# Patient Record
Sex: FEMALE | Race: WHITE | Employment: FULL TIME | URBAN - METROPOLITAN AREA
[De-identification: names, ages, dates, MRNs, and addresses within clinical notes are randomized per-mention and may not be internally consistent; named-entity substitution may affect disease eponyms.]

---

## 2017-06-26 ENCOUNTER — OFFICE VISIT (OUTPATIENT)
Dept: ONCOLOGY | Age: 42
End: 2017-06-26

## 2017-06-26 VITALS
BODY MASS INDEX: 34.23 KG/M2 | HEIGHT: 62 IN | OXYGEN SATURATION: 95 % | DIASTOLIC BLOOD PRESSURE: 80 MMHG | TEMPERATURE: 99.1 F | SYSTOLIC BLOOD PRESSURE: 122 MMHG | WEIGHT: 186 LBS | HEART RATE: 85 BPM | RESPIRATION RATE: 18 BRPM

## 2017-06-26 DIAGNOSIS — R59.1 LYMPHADENOPATHY OF HEAD AND NECK: Primary | ICD-10-CM

## 2017-06-26 RX ORDER — ALBUTEROL SULFATE 0.83 MG/ML
SOLUTION RESPIRATORY (INHALATION) ONCE
COMMUNITY

## 2017-06-26 RX ORDER — SERTRALINE HYDROCHLORIDE 100 MG/1
TABLET, FILM COATED ORAL DAILY
COMMUNITY

## 2017-06-26 RX ORDER — RANITIDINE 150 MG/1
150 TABLET, FILM COATED ORAL 2 TIMES DAILY
COMMUNITY

## 2017-06-26 RX ORDER — CLONAZEPAM 2 MG/1
TABLET ORAL 2 TIMES DAILY
COMMUNITY

## 2017-06-26 NOTE — PROGRESS NOTES
Erin Brown is a 38 y.o. female here for to establish care had concerns including New Patient. Dr. Shawn Schirmer referred patient with enlarged lymph nodes. No c/o at this time denies pain and sob. Ms. Suzanne Tarango has a reminder for a \"due or due soon\" health maintenance. I have asked that she contact her primary care provider for follow-up on this health maintenance.

## 2017-06-26 NOTE — PROGRESS NOTES
Hematology Oncology Consultation Note        Patient: Panda Key MRN: [id number]  SSN: [id number]    YOB: 1975  Age: 38 y.o. Sex: female      Subjective:      Panda Key is a 38 y.o. female who I am seeing in consultation for a enlarged LNs in the neck. She felt enlarged LNs in the left neck above the collar bone and in the back of the neck. She does not have night sweats, fatigue or weight loss. The lymph nodes have been shrinking in the last 1 week or so. Review of Systems:    Constitutional: negative  Eyes: negative  Ears, Nose, Mouth, Throat, and Face: negative  Respiratory: negative  Cardiovascular: negative  Gastrointestinal: negative  Genitourinary:negative  Integument/Breast: negative  Hematologic/Lymphatic: negative  Musculoskeletal:negative  Neurological: negative      Past Medical History:   Diagnosis Date    Asthma     no inhaler. allergy induced    Thyroid disease     Hypothyroidism     Past Surgical History:   Procedure Laterality Date    HX OTHER SURGICAL      migraines    HX OTHER SURGICAL      cat scratch fever      Family History   Problem Relation Age of Onset    Diabetes Father      Social History   Substance Use Topics    Smoking status: Never Smoker    Smokeless tobacco: Never Used    Alcohol use No      Prior to Admission medications    Medication Sig Start Date End Date Taking? Authorizing Provider   sertraline (ZOLOFT) 100 mg tablet Take  by mouth daily. Yes Historical Provider   clonazePAM (KLONOPIN) 2 mg tablet Take  by mouth two (2) times a day. Yes Historical Provider   albuterol (PROVENTIL VENTOLIN) 2.5 mg /3 mL (0.083 %) nebulizer solution by Nebulization route once. Yes Historical Provider   raNITIdine (ZANTAC) 150 mg tablet Take 150 mg by mouth two (2) times a day. Yes Historical Provider   hydrocodone-acetaminophen (LORTAB) 5-500 mg per tablet Take 1-2 Tabs by mouth every four (4) hours as needed for Pain.  10/16/10  Yes Chastity Claudio Erin Short MD   levothyroxine (SYNTHROID) 25 mcg tablet Take 25 mcg by mouth daily. 10/5/10  Yes Historical Provider              No Known Allergies        Objective:     Vitals:    06/26/17 1450   BP: 122/80   Pulse: 85   Resp: 18   Temp: 99.1 °F (37.3 °C)   SpO2: 95%   Weight: 186 lb (84.4 kg)   Height: 5' 2\" (1.575 m)            Physical Exam:    GENERAL: alert, cooperative, no distress, appears stated age  EYE: negative  LYMPHATIC: Cervical, supraclavicular, and axillary nodes normal.   THROAT & NECK: normal and no erythema or exudates noted. LUNG: clear to auscultation bilaterally  HEART: regular rate and rhythm  ABDOMEN: soft, non-tender  EXTREMITIES: no cyanosis or edema  SKIN: Normal.  NEUROLOGIC: negative            Assessment:     1. Lymphadenopathy    Head and neck  LNs are small and normal appearing  Improving. Appears benign  I reassured the patient. Plan:       1. Return as needed        Signed by: Page Landry MD                     June 26, 2017           CC.  Tessa Spencer MD

## 2022-01-31 ENCOUNTER — HOSPITAL ENCOUNTER (EMERGENCY)
Age: 47
Discharge: HOME | End: 2022-02-01
Attending: EMERGENCY MEDICINE
Payer: COMMERCIAL

## 2022-01-31 ENCOUNTER — APPOINTMENT (OUTPATIENT)
Dept: CT IMAGING | Age: 47
End: 2022-01-31
Attending: EMERGENCY MEDICINE
Payer: COMMERCIAL

## 2022-01-31 VITALS
BODY MASS INDEX: 34.93 KG/M2 | RESPIRATION RATE: 18 BRPM | WEIGHT: 189.82 LBS | HEART RATE: 80 BPM | OXYGEN SATURATION: 100 % | TEMPERATURE: 97.6 F | SYSTOLIC BLOOD PRESSURE: 159 MMHG | HEIGHT: 62 IN | DIASTOLIC BLOOD PRESSURE: 103 MMHG

## 2022-01-31 DIAGNOSIS — N13.2 URETERAL STONE WITH HYDRONEPHROSIS: Primary | ICD-10-CM

## 2022-01-31 LAB
ALBUMIN SERPL-MCNC: 3.3 G/DL (ref 3.5–5)
ALBUMIN/GLOB SERPL: 0.8 {RATIO} (ref 1.1–2.2)
ALP SERPL-CCNC: 41 U/L (ref 45–117)
ALT SERPL-CCNC: 32 U/L (ref 12–78)
ANION GAP SERPL CALC-SCNC: 8 MMOL/L (ref 5–15)
APPEARANCE UR: CLEAR
AST SERPL-CCNC: 38 U/L (ref 15–37)
BACTERIA URNS QL MICRO: ABNORMAL /HPF
BASOPHILS # BLD: 0.1 K/UL (ref 0–0.1)
BASOPHILS NFR BLD: 1 % (ref 0–1)
BILIRUB SERPL-MCNC: 0.3 MG/DL (ref 0.2–1)
BILIRUB UR QL: NEGATIVE
BUN SERPL-MCNC: 13 MG/DL (ref 6–20)
BUN/CREAT SERPL: 17 (ref 12–20)
CALCIUM SERPL-MCNC: 8.9 MG/DL (ref 8.5–10.1)
CHLORIDE SERPL-SCNC: 105 MMOL/L (ref 97–108)
CO2 SERPL-SCNC: 24 MMOL/L (ref 21–32)
COLOR UR: YELLOW
COMMENT, HOLDF: NORMAL
CREAT SERPL-MCNC: 0.77 MG/DL (ref 0.55–1.02)
DIFFERENTIAL METHOD BLD: ABNORMAL
EOSINOPHIL # BLD: 0.3 K/UL (ref 0–0.4)
EOSINOPHIL NFR BLD: 3 % (ref 0–7)
EPITH CASTS URNS QL MICRO: ABNORMAL /LPF
ERYTHROCYTE [DISTWIDTH] IN BLOOD BY AUTOMATED COUNT: 13.7 % (ref 11.5–14.5)
GLOBULIN SER CALC-MCNC: 3.9 G/DL (ref 2–4)
GLUCOSE SERPL-MCNC: 148 MG/DL (ref 65–100)
GLUCOSE UR STRIP.AUTO-MCNC: NEGATIVE MG/DL
HCG UR QL: NEGATIVE
HCT VFR BLD AUTO: 38.6 % (ref 35–47)
HGB BLD-MCNC: 12.9 G/DL (ref 11.5–16)
HGB UR QL STRIP: ABNORMAL
IMM GRANULOCYTES # BLD AUTO: 0.1 K/UL (ref 0–0.04)
IMM GRANULOCYTES NFR BLD AUTO: 1 % (ref 0–0.5)
KETONES UR QL STRIP.AUTO: NEGATIVE MG/DL
LEUKOCYTE ESTERASE UR QL STRIP.AUTO: ABNORMAL
LIPASE SERPL-CCNC: 89 U/L (ref 73–393)
LYMPHOCYTES # BLD: 4.5 K/UL (ref 0.8–3.5)
LYMPHOCYTES NFR BLD: 42 % (ref 12–49)
MCH RBC QN AUTO: 28.6 PG (ref 26–34)
MCHC RBC AUTO-ENTMCNC: 33.4 G/DL (ref 30–36.5)
MCV RBC AUTO: 85.6 FL (ref 80–99)
MONOCYTES # BLD: 0.7 K/UL (ref 0–1)
MONOCYTES NFR BLD: 7 % (ref 5–13)
NEUTS SEG # BLD: 4.9 K/UL (ref 1.8–8)
NEUTS SEG NFR BLD: 46 % (ref 32–75)
NITRITE UR QL STRIP.AUTO: POSITIVE
NRBC # BLD: 0 K/UL (ref 0–0.01)
NRBC BLD-RTO: 0 PER 100 WBC
PH UR STRIP: 5.5 [PH] (ref 5–8)
PLATELET # BLD AUTO: 352 K/UL (ref 150–400)
PMV BLD AUTO: 9.2 FL (ref 8.9–12.9)
POTASSIUM SERPL-SCNC: 3.7 MMOL/L (ref 3.5–5.1)
PROT SERPL-MCNC: 7.2 G/DL (ref 6.4–8.2)
PROT UR STRIP-MCNC: NEGATIVE MG/DL
RBC # BLD AUTO: 4.51 M/UL (ref 3.8–5.2)
RBC #/AREA URNS HPF: ABNORMAL /HPF (ref 0–5)
SAMPLES BEING HELD,HOLD: NORMAL
SODIUM SERPL-SCNC: 137 MMOL/L (ref 136–145)
SP GR UR REFRACTOMETRY: 1.02 (ref 1–1.03)
UA: UC IF INDICATED,UAUC: ABNORMAL
UROBILINOGEN UR QL STRIP.AUTO: 1 EU/DL (ref 0.2–1)
WBC # BLD AUTO: 10.5 K/UL (ref 3.6–11)
WBC URNS QL MICRO: ABNORMAL /HPF (ref 0–4)

## 2022-01-31 PROCEDURE — 83690 ASSAY OF LIPASE: CPT

## 2022-01-31 PROCEDURE — 36415 COLL VENOUS BLD VENIPUNCTURE: CPT

## 2022-01-31 PROCEDURE — 99283 EMERGENCY DEPT VISIT LOW MDM: CPT

## 2022-01-31 PROCEDURE — 85025 COMPLETE CBC W/AUTO DIFF WBC: CPT

## 2022-01-31 PROCEDURE — 96374 THER/PROPH/DIAG INJ IV PUSH: CPT

## 2022-01-31 PROCEDURE — 87086 URINE CULTURE/COLONY COUNT: CPT

## 2022-01-31 PROCEDURE — 81001 URINALYSIS AUTO W/SCOPE: CPT

## 2022-01-31 PROCEDURE — 80053 COMPREHEN METABOLIC PANEL: CPT

## 2022-01-31 PROCEDURE — 74177 CT ABD & PELVIS W/CONTRAST: CPT

## 2022-01-31 PROCEDURE — 74011000636 HC RX REV CODE- 636: Performed by: EMERGENCY MEDICINE

## 2022-01-31 PROCEDURE — 96375 TX/PRO/DX INJ NEW DRUG ADDON: CPT

## 2022-01-31 PROCEDURE — 81025 URINE PREGNANCY TEST: CPT

## 2022-01-31 PROCEDURE — 74011250636 HC RX REV CODE- 250/636: Performed by: EMERGENCY MEDICINE

## 2022-01-31 RX ORDER — ONDANSETRON 2 MG/ML
4 INJECTION INTRAMUSCULAR; INTRAVENOUS
Status: COMPLETED | OUTPATIENT
Start: 2022-01-31 | End: 2022-01-31

## 2022-01-31 RX ORDER — MORPHINE SULFATE 2 MG/ML
4 INJECTION, SOLUTION INTRAMUSCULAR; INTRAVENOUS
Status: COMPLETED | OUTPATIENT
Start: 2022-01-31 | End: 2022-01-31

## 2022-01-31 RX ORDER — KETOROLAC TROMETHAMINE 30 MG/ML
30 INJECTION, SOLUTION INTRAMUSCULAR; INTRAVENOUS
Status: COMPLETED | OUTPATIENT
Start: 2022-01-31 | End: 2022-01-31

## 2022-01-31 RX ORDER — HYDROMORPHONE HYDROCHLORIDE 1 MG/ML
1 INJECTION, SOLUTION INTRAMUSCULAR; INTRAVENOUS; SUBCUTANEOUS
Status: COMPLETED | OUTPATIENT
Start: 2022-01-31 | End: 2022-01-31

## 2022-01-31 RX ADMIN — KETOROLAC TROMETHAMINE 30 MG: 30 INJECTION, SOLUTION INTRAMUSCULAR; INTRAVENOUS at 23:15

## 2022-01-31 RX ADMIN — IOPAMIDOL 100 ML: 755 INJECTION, SOLUTION INTRAVENOUS at 22:26

## 2022-01-31 RX ADMIN — MORPHINE SULFATE 4 MG: 2 INJECTION, SOLUTION INTRAMUSCULAR; INTRAVENOUS at 21:46

## 2022-01-31 RX ADMIN — HYDROMORPHONE HYDROCHLORIDE 1 MG: 1 INJECTION, SOLUTION INTRAMUSCULAR; INTRAVENOUS; SUBCUTANEOUS at 23:15

## 2022-01-31 RX ADMIN — ONDANSETRON 4 MG: 2 INJECTION INTRAMUSCULAR; INTRAVENOUS at 21:46

## 2022-01-31 NOTE — Clinical Note
[address]  [facility] EMERGENCY DEPT  [address]  [place] [id number] [phone]    Work/School Note    Date: 1/31/2022    To Whom It May concern:    Hamilton Davis was seen and treated today in the emergency room by the following provider(s):  Attending Provider: Esther Downey MD.      Hamilton Davis is excused from work/school on 02/01/22 and 02/02/22. She is medically clear to return to work/school on 2/3/2022.        Sincerely,          Ly Marques MD

## 2022-02-01 PROCEDURE — 74011250637 HC RX REV CODE- 250/637: Performed by: EMERGENCY MEDICINE

## 2022-02-01 RX ORDER — CEFDINIR 300 MG/1
300 CAPSULE ORAL 2 TIMES DAILY
Qty: 14 CAPSULE | Refills: 0 | Status: SHIPPED | OUTPATIENT
Start: 2022-02-01 | End: 2022-02-08

## 2022-02-01 RX ORDER — CEFDINIR 300 MG/1
300 CAPSULE ORAL EVERY 12 HOURS
Status: DISCONTINUED | OUTPATIENT
Start: 2022-02-01 | End: 2022-02-01 | Stop reason: HOSPADM

## 2022-02-01 RX ORDER — TAMSULOSIN HYDROCHLORIDE 0.4 MG/1
0.4 CAPSULE ORAL DAILY
Qty: 7 CAPSULE | Refills: 0 | Status: SHIPPED | OUTPATIENT
Start: 2022-02-01 | End: 2022-02-08

## 2022-02-01 RX ORDER — OXYCODONE HYDROCHLORIDE 5 MG/1
5 TABLET ORAL
Qty: 12 TABLET | Refills: 0 | Status: SHIPPED | OUTPATIENT
Start: 2022-02-01 | End: 2022-02-04

## 2022-02-01 RX ORDER — ONDANSETRON 4 MG/1
4 TABLET, FILM COATED ORAL
Qty: 20 TABLET | Refills: 0 | Status: SHIPPED | OUTPATIENT
Start: 2022-02-01

## 2022-02-01 RX ADMIN — CEFDINIR 300 MG: 300 CAPSULE ORAL at 00:58

## 2022-02-01 NOTE — ED PROVIDER NOTES
EMERGENCY DEPARTMENT HISTORY AND PHYSICAL EXAM      Date: 1/31/2022  Patient Name: Gokul Jovel    History of Presenting Illness     Chief Complaint   Patient presents with    Abdominal Pain     Reports acute onset of sharp RLQ pain starting about an hour ago. Denied hx of kidney stones and denied N/V/D. History Provided By: Patient    HPI: Gokul Jovel, 48 y.o. female with PMHx as noted below presents the emergency department for evaluation of abdominal pain. Patient states that symptoms came on approximately 1 to 2 hours ago. She first noted some burning/pressure with urination, then had sudden onset of a severe right lower quadrant abdominal pain. Patient states that it is sharp, stabbing and nonradiating. Pain is been constant. Pt denies any other alleviating or exacerbating factors. Additionally, pt specifically denies any recent fever, chills, headache, nausea, vomiting, , CP, SOB, lightheadedness, dizziness, numbness, weakness, BLE swelling, heart palpitations, urinary sxs, diarrhea, constipation, melena, hematochezia, cough, or congestion. PCP: Esther Mario MD    Current Outpatient Medications   Medication Sig Dispense Refill    oxyCODONE IR (Roxicodone) 5 mg immediate release tablet Take 1 Tablet by mouth every six (6) hours as needed for Pain for up to 3 days. Max Daily Amount: 20 mg. 12 Tablet 0    ondansetron hcl (Zofran) 4 mg tablet Take 1 Tablet by mouth every eight (8) hours as needed for Nausea. 20 Tablet 0    tamsulosin (Flomax) 0.4 mg capsule Take 1 Capsule by mouth daily for 7 doses. 7 Capsule 0    cefdinir (OMNICEF) 300 mg capsule Take 1 Capsule by mouth two (2) times a day for 7 days. 14 Capsule 0    sertraline (ZOLOFT) 100 mg tablet Take  by mouth daily.  clonazePAM (KLONOPIN) 2 mg tablet Take  by mouth two (2) times a day.  albuterol (PROVENTIL VENTOLIN) 2.5 mg /3 mL (0.083 %) nebulizer solution by Nebulization route once.       raNITIdine (ZANTAC) 150 mg tablet Take 150 mg by mouth two (2) times a day.  hydrocodone-acetaminophen (LORTAB) 5-500 mg per tablet Take 1-2 Tabs by mouth every four (4) hours as needed for Pain. 20 Tab 0    levothyroxine (SYNTHROID) 25 mcg tablet Take 25 mcg by mouth daily. Past History     Past Medical History:  Past Medical History:   Diagnosis Date    Asthma     no inhaler. allergy induced    Thyroid disease     Hypothyroidism       Past Surgical History:  Past Surgical History:   Procedure Laterality Date    HX OTHER SURGICAL      migraines    HX OTHER SURGICAL      cat scratch fever       Family History:  Family History   Problem Relation Age of Onset    Diabetes Father        Social History:  Social History     Tobacco Use    Smoking status: Never Smoker    Smokeless tobacco: Never Used   Substance Use Topics    Alcohol use: No    Drug use: No       Allergies:  No Known Allergies      Review of Systems   Review of Systems  Constitutional: Negative for fever, chills, and fatigue. HENT: Negative for congestion, sore throat, rhinorrhea, sneezing and neck stiffness   Eyes: Negative for discharge and redness. Respiratory: Negative for  shortness of breath, wheezing   Cardiovascular: Negative for chest pain, palpitations   Gastrointestinal: Positive abdominal pain, nausea. .  Negative for  vomiting, constipation, diarrhea and blood in stool. Genitourinary: Negative for dysuria, hematuria, flank pain, decreased urine volume, discharge,   Musculoskeletal: Negative for myalgias or joint pain . Skin: Negative for rash or lesions . Neurological: Negative weakness, light-headedness, numbness and headaches. Physical Exam   Physical Exam    GENERAL: alert and oriented, in moderate distress  EYES: PEERL, No injection, discharge or icterus. ENT: Mucous membranes pink and moist.  NECK: Supple  LUNGS: Airway patent. Non-labored respirations.  Breath sounds clear with good air entry bilaterally. HEART: Regular rate and rhythm. No peripheral edema  ABDOMEN: Non-distended, right lower quadrant tenderness without guarding or rebound. SKIN:  warm, dry  MSK/EXTREMITIES: Without swelling, tenderness or deformity, symmetric with normal ROM  NEUROLOGICAL: Alert, oriented      Diagnostic Study Results     Labs -     Reviewed and interpreted by me    Radiologic Studies -   CT ABD PELV W CONT   Final Result      1. 5 mm distal right ureteral calculus produces mild right-sided hydronephrosis   and hydroureter. 2. Normal appendix. 3. Hepatic steatosis. CT Results  (Last 48 hours)               01/31/22 2225  CT ABD PELV W CONT Final result    Impression:      1. 5 mm distal right ureteral calculus produces mild right-sided hydronephrosis   and hydroureter. 2. Normal appendix. 3. Hepatic steatosis. Narrative:  EXAM: CT ABD PELV W CONT       INDICATION: RLQ ttp        COMPARISON: None        CONTRAST: 100 mL of Isovue-370. ORAL CONTRAST: None       TECHNIQUE:    Following the uneventful intravenous administration of contrast, thin axial   images were obtained through the abdomen and pelvis. Coronal and sagittal   reconstructions were generated. CT dose reduction was achieved through use of a   standardized protocol tailored for this examination and automatic exposure   control for dose modulation. FINDINGS:    LOWER THORAX: No significant abnormality in the incidentally imaged lower chest.   LIVER: Diffuse steatosis. BILIARY TREE: Gallbladder is within normal limits. CBD is not dilated. SPLEEN: Contains calcified granulomata. PANCREAS: No mass or ductal dilatation. ADRENALS: Unremarkable. KIDNEYS: 5 mm distal right ureteral calculus produces mild right-sided   hydronephrosis and hydroureter. STOMACH: Unremarkable. SMALL BOWEL: No dilatation or wall thickening. COLON: No dilatation or wall thickening.    APPENDIX: Normal   PERITONEUM: No ascites or pneumoperitoneum. RETROPERITONEUM: No lymphadenopathy or aortic aneurysm. REPRODUCTIVE ORGANS: Unremarkable. URINARY BLADDER: Decompressed. No calculus. BONES: No destructive bone lesion. ABDOMINAL WALL: No mass or hernia. ADDITIONAL COMMENTS: N/A               CXR Results  (Last 48 hours)    None            Medical Decision Making     Gustabo BRIONES MD am the first provider for this patient and am the attending of record for this patient encounter. I reviewed the vital signs, available nursing notes, past medical history, past surgical history, family history and social history. Vital Signs-Reviewed the patient's vital signs. No data found. Records Reviewed: Nursing Notes and Old Medical Records    Provider Notes (Medical Decision Making): The patient presents with a chief complaint of abdominal pain. Differential diagnosis considered includes acute appendicitis, acute cholecystitis, pancreatitis, gastritis, PUD, diverticulitis, mesenteric ischemia, abdominal aortic aneurysm, bowel obstruction, enteritis, colitis, fecal impaction, volvulus, IBS, inflammatory bowel disease, specific food intolerance, peritonitis, perforated viscous, malignancy, UTI, abscess, and abdominal pain NOS. Pelvic source of pain was also considered including  dysmenorrhea, ovarian cyst, ovarian torsion, PID, TOA, cervicitis, vaginitis, or uterine fibroid. All labs and diagnostic studies were reviewed and interpreted by me. Labs were reassuring, nondiagnostic. CT scan did show an eight obstructed right ureteral stone with mild hydronephrosis. Urinalysis did show positive nitrites however not convincing for infection, will send culture. Otherwise having no systemic symptoms suggestive of pyelonephritis or infected stone. Patient was given morphine and subsequent dose of IV Dilaudid, Zofran and Toradol. On reevaluation patient symptoms did not completely resolved. Pt is tolerating po.   The patient states that their symptoms have improved and he feels much better. There are no other new complaints at this time      There were no concerns for any acute life-threatening or acutely surgical pathology that would warrant admission at this time. Patient was given kidney stone hotline number and instructed to call this morning for follow-up vital signs were within acceptable limits at the time of discharge. Patient was in stable condition and felt to be reliable for outpatient management. There were no lab findings of immediate concern. The patient was advised to return to the emergency room for acutely worsening pain, persistence of pain, fevers, intractable vomiting o, inability to tolerate food/liquids, syncope, or change in mental status. Otherwise, the patient is encouraged to follow-up with a primary care physician within the week if possible. .  The patient understood the work-up and assessment and had no further questions. The patient was discharged home in stable clinical condition. ED Course:   Initial assessment performed. The patients presenting problems have been discussed, and they are in agreement with the care plan formulated and outlined with them. I have encouraged them to ask questions as they arise throughout their visit. Medications   morphine injection 4 mg (4 mg IntraVENous Given 1/31/22 2146)   ondansetron (ZOFRAN) injection 4 mg (4 mg IntraVENous Given 1/31/22 2146)   iopamidoL (ISOVUE-370) 76 % injection 100 mL (100 mL IntraVENous Given 1/31/22 2226)   HYDROmorphone (DILAUDID) injection 1 mg (1 mg IntraVENous Given 1/31/22 2315)   ketorolac (TORADOL) injection 30 mg (30 mg IntraVENous Given 1/31/22 2315)         PROGRESS  Devora Aguila's  results have been reviewed with her. She has been counseled regarding her diagnosis.   She verbally conveys understanding and agreement of the signs, symptoms, diagnosis, treatment and prognosis and additionally agrees to follow up as recommended with Dr. Eileen Carrasco MD in 24 - 48 hours. She also agrees with the care-plan and conveys that all of her questions have been answered. I have also put together some discharge instructions for her that include: 1) educational information regarding their diagnosis, 2) how to care for their diagnosis at home, as well a 3) list of reasons why they would want to return to the ED prior to their follow-up appointment, should their condition change. Disposition:  home    PLAN:  1. Discharge Medication List as of 2/1/2022  1:06 AM      START taking these medications    Details   oxyCODONE IR (Roxicodone) 5 mg immediate release tablet Take 1 Tablet by mouth every six (6) hours as needed for Pain for up to 3 days. Max Daily Amount: 20 mg., Normal, Disp-12 Tablet, R-0      ondansetron hcl (Zofran) 4 mg tablet Take 1 Tablet by mouth every eight (8) hours as needed for Nausea., Normal, Disp-20 Tablet, R-0      tamsulosin (Flomax) 0.4 mg capsule Take 1 Capsule by mouth daily for 7 doses. , Normal, Disp-7 Capsule, R-0      cefdinir (OMNICEF) 300 mg capsule Take 1 Capsule by mouth two (2) times a day for 7 days. , Normal, Disp-14 Capsule, R-0         CONTINUE these medications which have NOT CHANGED    Details   sertraline (ZOLOFT) 100 mg tablet Take  by mouth daily. , Historical Med      clonazePAM (KLONOPIN) 2 mg tablet Take  by mouth two (2) times a day., Historical Med      albuterol (PROVENTIL VENTOLIN) 2.5 mg /3 mL (0.083 %) nebulizer solution by Nebulization route once., Historical Med      raNITIdine (ZANTAC) 150 mg tablet Take 150 mg by mouth two (2) times a day., Historical Med      hydrocodone-acetaminophen (LORTAB) 5-500 mg per tablet 1-2 Tab, Oral, EVERY 4 HOURS AS NEEDED Starting 10/16/2010, Until Discontinued, Disp-20 Tab, R-0, Print      levothyroxine (SYNTHROID) 25 mcg tablet 25 mcg, Oral, DAILY Starting 10/5/2010, Until Discontinued, Historical Med           2.    Follow-up Information Follow up With Specialties Details Why Contact Ernesto Davis MD Family Medicine Schedule an appointment as soon as possible for a visit in 2 days  [address] [facility] [address] [place] [id number] [phone]      [facility] EMERGENCY DEPT Emergency Medicine  If symptoms worsen [address]  [address]   [address] [facility] [address] [place], [place] [address]  Schedule an appointment as soon as possible for a visit in 1 day  [address] [facility]  [phone]        Return to ED if worse     Diagnosis     Clinical Impression:   1. Ureteral stone with hydronephrosis        Please note that this dictation was completed with Dragon, computer voice recognition software. Quite often unanticipated grammatical, syntax, homophones, and other interpretive errors are inadvertently transcribed by the computer software. Please disregard these errors. Additionally, please excuse any errors that have escaped final proofreading.

## 2022-02-02 LAB
BACTERIA SPEC CULT: NORMAL
SERVICE CMNT-IMP: NORMAL

## 2022-03-20 PROBLEM — R59.1 LYMPHADENOPATHY OF HEAD AND NECK: Status: ACTIVE | Noted: 2017-06-26

## 2022-10-27 ENCOUNTER — NURSE NAVIGATOR (OUTPATIENT)
Dept: CASE MANAGEMENT | Age: 47
End: 2022-10-27

## 2022-10-27 DIAGNOSIS — Z17.0 MALIGNANT NEOPLASM OF RIGHT BREAST IN FEMALE, ESTROGEN RECEPTOR POSITIVE, UNSPECIFIED SITE OF BREAST (HCC): Primary | ICD-10-CM

## 2022-10-27 DIAGNOSIS — C50.911 MALIGNANT NEOPLASM OF RIGHT BREAST IN FEMALE, ESTROGEN RECEPTOR POSITIVE, UNSPECIFIED SITE OF BREAST (HCC): Primary | ICD-10-CM

## 2022-10-27 NOTE — PROGRESS NOTES
Order placed for BILATERAL breast MRI with and without contrast per verbal order of Dr. Marli Larson.

## 2022-11-04 ENCOUNTER — NURSE NAVIGATOR (OUTPATIENT)
Dept: CASE MANAGEMENT | Age: 47
End: 2022-11-04

## 2022-11-04 ENCOUNTER — HOSPITAL ENCOUNTER (OUTPATIENT)
Dept: MRI IMAGING | Age: 47
Discharge: HOME | End: 2022-11-04
Attending: SURGERY
Payer: COMMERCIAL

## 2022-11-04 DIAGNOSIS — Z17.0 MALIGNANT NEOPLASM OF RIGHT BREAST IN FEMALE, ESTROGEN RECEPTOR POSITIVE, UNSPECIFIED SITE OF BREAST (HCC): ICD-10-CM

## 2022-11-04 DIAGNOSIS — C50.911 MALIGNANT NEOPLASM OF RIGHT BREAST IN FEMALE, ESTROGEN RECEPTOR POSITIVE, UNSPECIFIED SITE OF BREAST (HCC): ICD-10-CM

## 2022-11-04 PROCEDURE — 74011000258 HC RX REV CODE- 258: Performed by: SURGERY

## 2022-11-04 PROCEDURE — 74011000250 HC RX REV CODE- 250: Performed by: SURGERY

## 2022-11-04 PROCEDURE — A9576 INJ PROHANCE MULTIPACK: HCPCS

## 2022-11-04 PROCEDURE — 74011250636 HC RX REV CODE- 250/636

## 2022-11-04 PROCEDURE — 77049 MRI BREAST C-+ W/CAD BI: CPT

## 2022-11-04 RX ORDER — SODIUM CHLORIDE 0.9 % (FLUSH) 0.9 %
10 SYRINGE (ML) INJECTION
Status: COMPLETED | OUTPATIENT
Start: 2022-11-04 | End: 2022-11-04

## 2022-11-04 RX ADMIN — SODIUM CHLORIDE 100 ML: 900 INJECTION, SOLUTION INTRAVENOUS at 12:00

## 2022-11-04 RX ADMIN — SODIUM CHLORIDE, PRESERVATIVE FREE 10 ML: 5 INJECTION INTRAVENOUS at 12:00

## 2022-11-04 RX ADMIN — GADOTERIDOL 18 ML: 279.3 INJECTION, SOLUTION INTRAVENOUS at 11:00

## 2022-11-04 NOTE — PROGRESS NOTES
[address]  Breast Navigator Encounter    Name:    Sly Milton  Age:    50 y.o. Diagnosis:     RIGHT breast cancer    Received e-mail from patient that she was at the MRI center at [facility], but was hesitant to have her breast MRI because she was on BCPs. I spoke to Gil Cunha at the MRI department, who had spoken to Dr. Frederick Portillo. After talking to the staff and to the patient, it was decided that she should have the MRI today. I reassured the patient that this is the usual protocol for newly diagnosed patients; we generally don't hold these studies up and time them with a menstrual cycle like we do the high risk patients. She was very appreciative.                               Manjit Gutiérrez RN, BSN, [facility]  Oncology Breast Navigator     [address]  [address] [place], [place] [address]  [address].  W: [phone]  F: [phone]  [email or web address]  Good Help to Those in [facility]

## 2022-11-04 NOTE — H&P (VIEW-ONLY)
HISTORY OF PRESENT ILLNESS  Honey Olivia is a 50 y.o. female. HPI NEW patient consult referred by  Dr. Lizbet Jerry for RIGHT breast IDC. Found by imaging. Denies any breast changes. 10/14/22- US guided RIGHT breast biopsy ([facility]): RIGHT IDC 3:00 grd 2, 6mm, ER 80%, NE 50%, KI-67 <5%, HER2 equivocal (FISH pending)  Genetic testing pending through [facility]      Family History: Mother, breast cancer  Maternal grandmother, ocular cancer  Great uncle, lung cancer      MRI, 11/4/22  Mammogram, 10/12/22, [facility], BIRADS 5, RIGHT breast mass @ 3:00 anterior depth 4 cmfn 1.8 x 1.2 x 1.3 cm    Study Result    Narrative & Impression   MRI BREAST BI W WO CONT, 11/4/2022 11:32 AM  INDICATION: Malignant neoplasm of unspecified site of right female breast / New  diagnosis of RIGHT breast cancer. Patient bringing films from [facility]. HISTORY: Newly diagnosed invasive ductal carcinoma  COMPARISON: Mammograms and ultrasound from an outside facility in 2022  . TECHNIQUE:  Bilateral breast MRI was performed using a dedicated breast coil without  compression with the patient in the prone position. Precontrast T1-weighted  images with fat suppression were obtained followed by bolus injection of  contrast. Postcontrast dynamic and high-resolution images were acquired. T2-weighted axial imaging with fat suppression was also performed. The images  were analyzed using software for kinetic analysis, enhancement curves, digital  subtraction, and reconstructions. Bria Red RIGHT BREAST:  Background parenchymal enhancement: Mild   There is an irregular area of nonmasslike enhancement in the 2-3 o'clock  anterior 3rd which measures approximately 1.2 x 0.6 x 1.1 cm. There is a biopsy  clip at the posterior margin of this enhancement. There are no suspicious internal mammary or axillary chain lymph nodes. Bria Red   LEFT BREAST:  Background parenchymal enhancement: Mild  There is no suspicious masslike or nonmasslike enhancement within the left  breast to indicate breast carcinoma. There are no suspicious internal mammary or axillary chain lymph nodes. .  IMPRESSION  RIGHT BREAST:  1. Irregular area of nonmasslike enhancement at 2-3 o'clock, anterior 3rd,  measuring up to 1.2 cm. BI-RADS Category 6 - Known biopsy-proven malignancy. Javier Nadeem LEFT BREAST:  1. BI-RADS Category 1 - Negative. Past Medical History:   Diagnosis Date    Asthma     no inhaler. allergy induced    Thyroid disease     Hypothyroidism     Past Surgical History:   Procedure Laterality Date    HX OTHER SURGICAL      migraines    HX OTHER SURGICAL      cat scratch fever     Social History     Socioeconomic History    Marital status:      Spouse name: Not on file    Number of children: Not on file    Years of education: Not on file    Highest education level: Not on file   Occupational History    Not on file   Tobacco Use    Smoking status: Never    Smokeless tobacco: Never   Substance and Sexual Activity    Alcohol use: No    Drug use: No    Sexual activity: Yes     Partners: Male   Other Topics Concern    Not on file   Social History Narrative    Not on file     Social Determinants of Health     Financial Resource Strain: Not on file   Food Insecurity: Not on file   Transportation Needs: Not on file   Physical Activity: Not on file   Stress: Not on file   Social Connections: Not on file   Intimate Partner Violence: Not on file   Housing Stability: Not on file     OB History          1    Para   1    Term   1            AB        Living   1         SAB        IAB        Ectopic        Molar        Multiple        Live Births   1                Current Outpatient Medications:     ondansetron hcl (Zofran) 4 mg tablet, Take 1 Tablet by mouth every eight (8) hours as needed for Nausea., Disp: 20 Tablet, Rfl: 0    sertraline (ZOLOFT) 100 mg tablet, Take  by mouth daily. , Disp: , Rfl:     clonazePAM (KLONOPIN) 2 mg tablet, Take  by mouth two (2) times a day. , Disp: , Rfl:     albuterol (PROVENTIL VENTOLIN) 2.5 mg /3 mL (0.083 %) nebulizer solution, by Nebulization route once., Disp: , Rfl:     raNITIdine (ZANTAC) 150 mg tablet, Take 150 mg by mouth two (2) times a day., Disp: , Rfl:     hydrocodone-acetaminophen (LORTAB) 5-500 mg per tablet, Take 1-2 Tabs by mouth every four (4) hours as needed for Pain. (Patient not taking: Reported on 11/7/2022), Disp: 20 Tab, Rfl: 0    levothyroxine (SYNTHROID) 25 mcg tablet, Take 25 mcg by mouth daily. , Disp: , Rfl:   No Known Allergies   Review of Systems   All other systems reviewed and are negative. Physical Exam  Vitals and nursing note reviewed. Chest:   Breasts:     Right: No swelling, bleeding, inverted nipple, mass, nipple discharge, skin change or tenderness. Left: No swelling, bleeding, inverted nipple, mass, nipple discharge, skin change or tenderness. Lymphadenopathy:      Upper Body:      Right upper body: No axillary adenopathy. Left upper body: No axillary adenopathy. BREAST ULTRASOUND, Preop planning  Indication:preop planning  right Breast 3:00    Technique: The area was scanned using a high-frequency linear-array near-field transducer  Findings: 1.2 cm irregular mass with dropout  Impression: Biopsy site visible with ultrasound  Disposition:  Will schedule lumpectomy with sentinel lymph node biopsy       ASSESSMENT and PLAN    ICD-10-CM ICD-9-CM    1. Malignant neoplasm of upper-inner quadrant of right breast in female, estrogen receptor [facility]  C50.211 174.2     Z17.0 V86.0       51 yo female with right breast cancer DC 3:00 grd 2, 6mm, ER 80%, DE 50%, KI-67 <5%, HER2 equivocal (FISH pending)  T1 N0  Genetic testing sent at [address]    90 minutes were spent face-to-face with the patient during this encounter and 90% of that time was spent on counseling and coordination of care. 1. Discussed lumpectomy and radiation vs mastectomy. Discussed reconstruction.   MRI ordered to see if patient is a candidate for a lumpectomy. 2. Discussed sentinel lymph node biopsy. 3. Discussed external beam radiation. 4. Discussed hormone therapy. 5. Discussed the possibility of chemotherapy. Will send mammaprint  Plan is right us guided lumpectomy, sln biopsy   Will schedule.

## 2022-11-04 NOTE — PROGRESS NOTES
HISTORY OF PRESENT ILLNESS  Zbigniew Gardner is a 50 y.o. female. HPI NEW patient consult referred by  Dr. Garrick Tanner for RIGHT breast IDC. Found by imaging. Denies any breast changes. 10/14/22- US guided RIGHT breast biopsy ([facility]): RIGHT IDC 3:00 grd 2, 6mm, ER 80%, KY 50%, KI-67 <5%, HER2 equivocal (FISH pending)  Genetic testing pending through [facility]      Family History: Mother, breast cancer  Maternal grandmother, ocular cancer  Great uncle, lung cancer      MRI, 11/4/22  Mammogram, 10/12/22, [facility], BIRADS 5, RIGHT breast mass @ 3:00 anterior depth 4 cmfn 1.8 x 1.2 x 1.3 cm    Study Result    Narrative & Impression   MRI BREAST BI W WO CONT, 11/4/2022 11:32 AM  INDICATION: Malignant neoplasm of unspecified site of right female breast / New  diagnosis of RIGHT breast cancer. Patient bringing films from [facility]. HISTORY: Newly diagnosed invasive ductal carcinoma  COMPARISON: Mammograms and ultrasound from an outside facility in 2022  . TECHNIQUE:  Bilateral breast MRI was performed using a dedicated breast coil without  compression with the patient in the prone position. Precontrast T1-weighted  images with fat suppression were obtained followed by bolus injection of  contrast. Postcontrast dynamic and high-resolution images were acquired. T2-weighted axial imaging with fat suppression was also performed. The images  were analyzed using software for kinetic analysis, enhancement curves, digital  subtraction, and reconstructions. Javier Nadeem RIGHT BREAST:  Background parenchymal enhancement: Mild   There is an irregular area of nonmasslike enhancement in the 2-3 o'clock  anterior 3rd which measures approximately 1.2 x 0.6 x 1.1 cm. There is a biopsy  clip at the posterior margin of this enhancement. There are no suspicious internal mammary or axillary chain lymph nodes. Javier Nadeem   LEFT BREAST:  Background parenchymal enhancement: Mild  There is no suspicious masslike or nonmasslike enhancement within the left  breast to indicate breast carcinoma. There are no suspicious internal mammary or axillary chain lymph nodes. .  IMPRESSION  RIGHT BREAST:  1. Irregular area of nonmasslike enhancement at 2-3 o'clock, anterior 3rd,  measuring up to 1.2 cm. BI-RADS Category 6 - Known biopsy-proven malignancy. Graeme Osei LEFT BREAST:  1. BI-RADS Category 1 - Negative. Past Medical History:   Diagnosis Date    Asthma     no inhaler. allergy induced    Thyroid disease     Hypothyroidism     Past Surgical History:   Procedure Laterality Date    HX OTHER SURGICAL      migraines    HX OTHER SURGICAL      cat scratch fever     Social History     Socioeconomic History    Marital status:      Spouse name: Not on file    Number of children: Not on file    Years of education: Not on file    Highest education level: Not on file   Occupational History    Not on file   Tobacco Use    Smoking status: Never    Smokeless tobacco: Never   Substance and Sexual Activity    Alcohol use: No    Drug use: No    Sexual activity: Yes     Partners: Male   Other Topics Concern    Not on file   Social History Narrative    Not on file     Social Determinants of Health     Financial Resource Strain: Not on file   Food Insecurity: Not on file   Transportation Needs: Not on file   Physical Activity: Not on file   Stress: Not on file   Social Connections: Not on file   Intimate Partner Violence: Not on file   Housing Stability: Not on file     OB History          1    Para   1    Term   1            AB        Living   1         SAB        IAB        Ectopic        Molar        Multiple        Live Births   1                Current Outpatient Medications:     ondansetron hcl (Zofran) 4 mg tablet, Take 1 Tablet by mouth every eight (8) hours as needed for Nausea., Disp: 20 Tablet, Rfl: 0    sertraline (ZOLOFT) 100 mg tablet, Take  by mouth daily. , Disp: , Rfl:     clonazePAM (KLONOPIN) 2 mg tablet, Take  by mouth two (2) times a day. , Disp: , Rfl:     albuterol (PROVENTIL VENTOLIN) 2.5 mg /3 mL (0.083 %) nebulizer solution, by Nebulization route once., Disp: , Rfl:     raNITIdine (ZANTAC) 150 mg tablet, Take 150 mg by mouth two (2) times a day., Disp: , Rfl:     hydrocodone-acetaminophen (LORTAB) 5-500 mg per tablet, Take 1-2 Tabs by mouth every four (4) hours as needed for Pain. (Patient not taking: Reported on 11/7/2022), Disp: 20 Tab, Rfl: 0    levothyroxine (SYNTHROID) 25 mcg tablet, Take 25 mcg by mouth daily. , Disp: , Rfl:   No Known Allergies   Review of Systems   All other systems reviewed and are negative. Physical Exam  Vitals and nursing note reviewed. Chest:   Breasts:     Right: No swelling, bleeding, inverted nipple, mass, nipple discharge, skin change or tenderness. Left: No swelling, bleeding, inverted nipple, mass, nipple discharge, skin change or tenderness. Lymphadenopathy:      Upper Body:      Right upper body: No axillary adenopathy. Left upper body: No axillary adenopathy. BREAST ULTRASOUND, Preop planning  Indication:preop planning  right Breast 3:00    Technique: The area was scanned using a high-frequency linear-array near-field transducer  Findings: 1.2 cm irregular mass with dropout  Impression: Biopsy site visible with ultrasound  Disposition:  Will schedule lumpectomy with sentinel lymph node biopsy       ASSESSMENT and PLAN    ICD-10-CM ICD-9-CM    1. Malignant neoplasm of upper-inner quadrant of right breast in female, [facility]  C50.211 174.2     Z17.0 V86.0       49 yo female with right breast cancer DC 3:00 grd 2, 6mm, ER 80%, GA 50%, KI-67 <5%, HER2 equivocal (FISH pending)  T1 N0  Genetic testing sent at [address]    90 minutes were spent face-to-face with the patient during this encounter and 90% of that time was spent on counseling and coordination of care. 1. Discussed lumpectomy and radiation vs mastectomy. Discussed reconstruction.   MRI ordered to see if patient is a candidate for a lumpectomy. 2. Discussed sentinel lymph node biopsy. 3. Discussed external beam radiation. 4. Discussed hormone therapy. 5. Discussed the possibility of chemotherapy. Will send mammaprint  Plan is right us guided lumpectomy, sln biopsy   Will schedule.

## 2022-11-04 NOTE — PROGRESS NOTES
[address]  Breast Navigator Encounter    Name:  Dee Dee Samano      Age:  50 y.o. Diagnosis:    RIGHT breast cancer      Interdisciplinary Team:  Med-Onc:                          Surg-Onc:             Dr. Long Lucas:         Plastics:           :     Nurse Navigator:  Darien Cross RN, BSN, [facility]    Encounter type:  []Patient Initiated  [x]Navigator Follow-up []Pre-op  []Post-op  []Check-in Prior to First Treatment []Treatment Modality Change  []Initial Navigator Encounter []Other:       Narrative:     Called patient to let her know that I saw her breast MRI reading, and all is good. She has the known area in the RIGHT breast, which actually measures smaller than on U/S at 1.2 cm. No enlarged lymph nodes, no other enhancement. She was very relieved. I wished her a good weekend! I will plan to meet her on Monday.           Darien Cross RN, BSN, [facility]  Oncology Breast Navigator     [address]  [address] [facility]  W: [phone]  F: [phone]  [email or web address]  Good Help to Those in [facility]
I have reviewed and confirmed nurses' notes...

## 2022-11-07 ENCOUNTER — DOCUMENTATION ONLY (OUTPATIENT)
Dept: SURGERY | Age: 47
End: 2022-11-07

## 2022-11-07 ENCOUNTER — NURSE NAVIGATOR (OUTPATIENT)
Dept: CASE MANAGEMENT | Age: 47
End: 2022-11-07

## 2022-11-07 ENCOUNTER — OFFICE VISIT (OUTPATIENT)
Dept: SURGERY | Age: 47
End: 2022-11-07
Payer: COMMERCIAL

## 2022-11-07 VITALS — WEIGHT: 189 LBS | BODY MASS INDEX: 34.78 KG/M2 | HEIGHT: 62 IN

## 2022-11-07 DIAGNOSIS — Z17.0 MALIGNANT NEOPLASM OF UPPER-INNER QUADRANT OF RIGHT BREAST IN FEMALE, ESTROGEN RECEPTOR POSITIVE (HCC): Primary | ICD-10-CM

## 2022-11-07 DIAGNOSIS — C50.911 MALIGNANT NEOPLASM OF RIGHT BREAST IN FEMALE, ESTROGEN RECEPTOR POSITIVE, UNSPECIFIED SITE OF BREAST (HCC): Primary | ICD-10-CM

## 2022-11-07 DIAGNOSIS — C50.211 MALIGNANT NEOPLASM OF UPPER-INNER QUADRANT OF RIGHT BREAST IN FEMALE, ESTROGEN RECEPTOR POSITIVE (HCC): Primary | ICD-10-CM

## 2022-11-07 DIAGNOSIS — Z17.0 MALIGNANT NEOPLASM OF RIGHT BREAST IN FEMALE, ESTROGEN RECEPTOR POSITIVE, UNSPECIFIED SITE OF BREAST (HCC): Primary | ICD-10-CM

## 2022-11-07 PROCEDURE — 99205 OFFICE O/P NEW HI 60 MIN: CPT | Performed by: SURGERY

## 2022-11-07 PROCEDURE — 76642 ULTRASOUND BREAST LIMITED: CPT | Performed by: SURGERY

## 2022-11-07 NOTE — PROGRESS NOTES
[address]  Breast Navigator Encounter    Name:  Rosario Fragoso      Age:  50 y.o. Diagnosis:    RIGHT breast cancer      Interdisciplinary Team:  Med-Onc:                          Surg-Onc:             Dr. Gross Net:         Plastics:           :     Nurse Navigator:  Peyton Moncada RN, BSN, [facility]    Encounter type:  []Patient Initiated  [x]Navigator Follow-up []Pre-op  []Post-op  []Check-in Prior to First Treatment []Treatment Modality Change  []Initial Navigator Encounter []Other:       Narrative:    Met patient and  at the time of her breast talk with Dr. Anam Ayers. They had a very good visit, and both felt like all of their questions were answered by Dr. Anam Ayers. I discussed upcoming plan, which is for a MammaPrint to be ordered and a lumpectomy to be scheduled. I again went over my role and encouraged the patient to reach out to me with any further questions. Referrals/Handouts:    Business card, breast cancer pin.             Peyton Moncada RN, BSN, [facility]  Oncology Breast Navigator     [address]  [address] [place] [address] [place]  W: [phone]  F: [phone]  [email or web address]  Good Help to Those in [facility]

## 2022-11-08 ENCOUNTER — DOCUMENTATION ONLY (OUTPATIENT)
Dept: SURGERY | Age: 47
End: 2022-11-08

## 2022-11-08 NOTE — PROGRESS NOTES
Patient Surgery Information Sheet      Patient Name:  Carlene Child  Surgery Date:  November 29, 2022  Type of Surgery:  RIGHT BREAST ULTRASOUND GUIDED LUMPECTOMY RIGHT BREAST SENTINEL NODE BIOPSY   Time of Surgery:  07:30 AM  Pre-Procedure required? YES 11/28/2022 AT 3:00 PM    1. [place] Node Biopsy which is a pre-op injection prior to surgery:   Scheduled 2 hours prior to surgery  2. Needle Localization which is a pre-op procedure where a wire will be inserted prior to surgery:  Scheduled 2 hours prior to surgery    Pre-Operative Testing Department will call prior to surgery to determine if you need to come in and will schedule if needed. Arrival Time on the day of Surgery:    Hospital:  [facility] [address] [address]    Check in is located:  [address] entrance and make an immediate left and check in at patient access. YOU MAY NOT GET A REMINDER CALL FROM THE HOSPITAL    Pre-Operative Instructions:      \" Nothing to eat or drink after midnight the night before surgery  \" Do not wear makeup, lotion, deodorant, perfume  \" Please have a  to take you home from the hospital, failure to have a  could result in canceled surgery  \" All valuables should be left at home, the hospital Is not responsible for valuables  \" Special Instructions if needed:        Follow up appointment for post-operative visit with provider :   December 12, 2022    Matthew [email or web address]   [id number] [facility] [phone]

## 2022-11-18 ENCOUNTER — HOSPITAL ENCOUNTER (OUTPATIENT)
Dept: PREADMISSION TESTING | Age: 47
Discharge: HOME | End: 2022-11-18
Payer: COMMERCIAL

## 2022-11-18 VITALS
DIASTOLIC BLOOD PRESSURE: 81 MMHG | BODY MASS INDEX: 34.78 KG/M2 | HEIGHT: 62 IN | WEIGHT: 189 LBS | SYSTOLIC BLOOD PRESSURE: 134 MMHG | HEART RATE: 69 BPM | RESPIRATION RATE: 12 BRPM | TEMPERATURE: 97.4 F

## 2022-11-18 LAB
BASOPHILS # BLD: 0.1 K/UL (ref 0–0.1)
BASOPHILS NFR BLD: 1 % (ref 0–1)
DIFFERENTIAL METHOD BLD: NORMAL
EOSINOPHIL # BLD: 0.2 K/UL (ref 0–0.4)
EOSINOPHIL NFR BLD: 2 % (ref 0–7)
ERYTHROCYTE [DISTWIDTH] IN BLOOD BY AUTOMATED COUNT: 13.8 % (ref 11.5–14.5)
EST. AVERAGE GLUCOSE BLD GHB EST-MCNC: 126 MG/DL
HBA1C MFR BLD: 6 % (ref 4–5.6)
HCT VFR BLD AUTO: 39.3 % (ref 35–47)
HGB BLD-MCNC: 12.9 G/DL (ref 11.5–16)
IMM GRANULOCYTES # BLD AUTO: 0 K/UL (ref 0–0.04)
IMM GRANULOCYTES NFR BLD AUTO: 0 % (ref 0–0.5)
LYMPHOCYTES # BLD: 3.2 K/UL (ref 0.8–3.5)
LYMPHOCYTES NFR BLD: 32 % (ref 12–49)
MCH RBC QN AUTO: 29.1 PG (ref 26–34)
MCHC RBC AUTO-ENTMCNC: 32.8 G/DL (ref 30–36.5)
MCV RBC AUTO: 88.5 FL (ref 80–99)
MONOCYTES # BLD: 0.5 K/UL (ref 0–1)
MONOCYTES NFR BLD: 5 % (ref 5–13)
NEUTS SEG # BLD: 5.8 K/UL (ref 1.8–8)
NEUTS SEG NFR BLD: 60 % (ref 32–75)
NRBC # BLD: 0 K/UL (ref 0–0.01)
NRBC BLD-RTO: 0 PER 100 WBC
PLATELET # BLD AUTO: 313 K/UL (ref 150–400)
PMV BLD AUTO: 9.9 FL (ref 8.9–12.9)
RBC # BLD AUTO: 4.44 M/UL (ref 3.8–5.2)
WBC # BLD AUTO: 9.8 K/UL (ref 3.6–11)

## 2022-11-18 PROCEDURE — 83036 HEMOGLOBIN GLYCOSYLATED A1C: CPT

## 2022-11-18 PROCEDURE — 85025 COMPLETE CBC W/AUTO DIFF WBC: CPT

## 2022-11-18 PROCEDURE — 36415 COLL VENOUS BLD VENIPUNCTURE: CPT

## 2022-11-18 RX ORDER — PREDNISONE 5 MG/1
5 TABLET ORAL 2 TIMES DAILY
COMMUNITY

## 2022-11-18 RX ORDER — OMEGA-3 FATTY ACIDS/FISH OIL 300-500 MG
1 CAPSULE ORAL DAILY
COMMUNITY
End: 2022-11-29

## 2022-11-18 RX ORDER — METFORMIN HYDROCHLORIDE 500 MG/1
1000 TABLET ORAL
COMMUNITY

## 2022-11-18 RX ORDER — RIMEGEPANT SULFATE 75 MG/75MG
75 TABLET, ORALLY DISINTEGRATING ORAL
COMMUNITY

## 2022-11-18 RX ORDER — MELATONIN
1000 DAILY
COMMUNITY

## 2022-11-18 RX ORDER — CETIRIZINE HYDROCHLORIDE 10 MG/1
10 TABLET ORAL
COMMUNITY

## 2022-11-18 RX ORDER — LORAZEPAM 1 MG/1
1 TABLET ORAL
COMMUNITY

## 2022-11-18 RX ORDER — LANOLIN ALCOHOL/MO/W.PET/CERES
CREAM (GRAM) TOPICAL
COMMUNITY

## 2022-11-18 RX ORDER — ESCITALOPRAM OXALATE 10 MG/1
10 TABLET ORAL
COMMUNITY

## 2022-11-18 RX ORDER — KETOROLAC TROMETHAMINE 10 MG/1
10 TABLET, FILM COATED ORAL
COMMUNITY

## 2022-11-18 RX ORDER — SCOLOPAMINE TRANSDERMAL SYSTEM 1 MG/1
1 PATCH, EXTENDED RELEASE TRANSDERMAL ONCE
Qty: 1 PATCH | Refills: 0 | Status: SHIPPED | OUTPATIENT
Start: 2022-11-18 | End: 2022-11-18

## 2022-11-18 NOTE — PERIOP NOTES
Preoperative instructions reviewed with patient. Patient given SSI infection FAQS sheet. Given two bottles of skin prep chlorhexidine soap; given written and verbal instructions on use. Patient was given the opportunity to ask questions on the information provided. ARNULFO HORN NP SPOKE WITH PATIENT REGARDING HER NAUSEA/VOMITING POST OP. SHE WILL ORDER SCOPOLAMINE PATCH FOR PATIENT. PATIENT INSTRUCTED ON USE.      COPY OF COVID VACCINATION CARD PLACED ON CHART. Surgical consent obtained and signed by patient.

## 2022-11-18 NOTE — PERIOP NOTES
[address] INSTRUCTIONS    Surgery Date:   11/29/22    Your surgeon's office or [facility] staff will call you between 4 PM- 8 PM the day before surgery with your arrival time. If your surgery is on a Monday, you will receive a call the preceding Friday. Please report to [facility] Patient Access/Admitting on the 1st floor. Bring your insurance card, photo identification, and any copayment ( if applicable). If you are going home the same day of your surgery, you must have a responsible adult to drive you home. You need to have a responsible adult to stay with you the first 24 hours after surgery and you should not drive a car for 24 hours following your surgery. Nothing to eat or drink after midnight the night before surgery. This includes no water, gum, mints, coffee, juice, etc.  Please note special instructions, if applicable, below for medications. Do NOT drink alcohol or smoke 24 hours before surgery. STOP smoking for 14 days prior as it helps with breathing and healing after surgery. If you are being admitted to the hospital, please leave personal belongings/luggage in your car until you have an assigned hospital room number. Please wear comfortable clothes. Wear your glasses instead of contacts. We ask that all money, jewelry and valuables be left at home. Wear no make up, particularly mascara, the day of surgery. All body piercings, rings, and jewelry need to be removed and left at home. Please remove any nail polish or artificial nails from your fingernails. Please wear your hair loose or down. Please no pony-tails, buns, or any metal hair accessories. If you shower the morning of surgery, please do not apply any lotions or powders afterwards. You may wear deodorant, unless having breast surgery. Do not shave any body area within 24 hours of your surgery. Please follow all instructions to avoid any potential surgical cancellation.   Should your physical condition change, (i.e. fever, cold, flu, etc.) please notify your surgeon as soon as possible. It is important to be on time. If a situation occurs where you may be delayed, please call:  [phone] / [phone] on the day of surgery. The Preadmission Testing staff can be reached at [phone]. Special instructions: NONE      Current Outpatient Medications   Medication Sig    LORazepam (ATIVAN) 1 mg tablet Take 1 mg by mouth every six (6) hours as needed for Anxiety. Indications: anxious    escitalopram oxalate (Lexapro) 10 mg tablet Take 10 mg by mouth nightly. metFORMIN (GLUCOPHAGE) 500 mg tablet Take 1,000 mg by mouth nightly. predniSONE (DELTASONE) 5 mg tablet Take 5 mg by mouth two (2) times a day. TAKE FOR 7 DAYS    rimegepant (Nurtec ODT) 75 mg disintegrating tablet Take 75 mg by mouth once as needed for Migraine. ketorolac (TORADOL) 10 mg tablet Take 10 mg by mouth. Indications: MIGRAINE    desogestreL-ethinyl estradioL (VELIVET) 0.1/.125/.15-25 mg-mcg tablet Take 1 Tablet by mouth daily. cetirizine (ZyrTEC) 10 mg tablet Take 10 mg by mouth nightly. cholecalciferol (Vitamin D3) (1000 Units /25 mcg) tablet Take 1,000 Units by mouth daily. melatonin 3 mg tablet Take  by mouth nightly. fish oil-omega-3 fatty acids (Fish OiL) 300-500 mg cap Take 1 Tablet by mouth daily. albuterol (PROVENTIL VENTOLIN) 2.5 mg /3 mL (0.083 %) nebulizer solution by Nebulization route once. (Patient not taking: Reported on 11/18/2022)     No current facility-administered medications for this encounter. YOU MUST ONLY TAKE THESE MEDICATIONS THE MORNING OF SURGERY WITH A SIP OF WATER: NONE  MEDICATIONS TO TAKE THE MORNING OF SURGERY ONLY IF NEEDED: ATIVAN  HOLD these prescription medications BEFORE Surgery: STOP METFORMIN FOR 24 HOURS PRIOR TO SURGERY. (STOP ON 11/28/22).   STOP KETOROLAC FOR 3 DAYS PRIOR TO SURGERY (STOP ON 11/26/22)  Ask your surgeon/prescribing physician about when/if to STOP taking these medications: BIRTH CONTROL PILLS  Stop all vitamins, herbal medicines and Aspirin containing products 7 days prior to surgery. Stop any non-steroidal anti-inflammatory drugs (i.e. Ibuprofen, Naproxen, Advil, Aleve) 3 days before surgery. You may take Tylenol. If you are currently taking Plavix, Coumadin, or any other blood-thinning/anticoagulant medication contact your prescribing physician for instructions. Preventing Infections Before and After - Your Surgery    IMPORTANT INSTRUCTIONS      You play an important role in your health and preparation for surgery. To reduce the germs on your skin you will need to shower with CHG soap (Chorhexidine gluconate 4%) two times before surgery. CHG soap (Hibiclens, Hex-A-Clens or store brand)  CHG soap will be provided at your Preadmission Testing (PAT) appointment. If you do not have a PAT appointment before surgery, you may arrange to  CHG soap from our office or purchase CHG soap at a pharmacy, grocery or department store. You need to purchase TWO 4 ounce bottles to use for your 2 showers. Steps to follow:  [place] your hair with your normal shampoo and your body with regular soap and rinse well to remove shampoo and soap from your skin. Wet a clean washcloth and turn off the shower. Put CHG soap on washcloth and apply to your entire body from the neck down. Do not use on your head, face or private parts(genitals). Do not use CHG soap on open sores, wounds or areas of skin irritation. Wash you body gently for 5 minutes. Do not wash your skin too hard. This soap does not create lather. Pay special attention to your underarms and from your belly button to your feet. Turn the shower back on and rinse well to get CHG soap off your body. Pat your skin dry with a clean, dry towel. Do not apply lotions or moisturizer. Put on clean clothes and sleep on fresh bed sheets and do not allow pets to sleep with you.     Shower with CHG soap 2 times before your surgery  The evening before your surgery  The morning of your surgery      Tips to help prevent infections after your surgery:  Protect your surgical wound from germs:  Hand washing is the most important thing you and your caregivers can do to prevent infections. Keep your bandage clean and dry! Do not touch your surgical wound. Use clean, freshly washed towels and washcloths every time you shower; do not share bath linens with others. Until your surgical wound is healed, wear clothing and sleep on bed linens each day that are clean and freshly washed. Do not allow pets to sleep in your bed with you or touch your surgical wound. Do not smoke - smoking delays wound healing. This may be a good time to stop smoking. If you have diabetes, it is important for you to manage your blood sugar levels properly before your surgery as well as after your surgery. Poorly managed blood sugar levels slow down wound healing and prevent you from healing completely. Patient Information Regarding COVID Restrictions    Day of Procedure    Please park in the parking deck or any designated visitor parking lot. Enter the facility through the Main Entrance of the hospital.  On the day of surgery, please provide the cell phone number of the person who will be waiting for you to the Patient Access representative at the time of registration. Masks are highly recommended in the hospital, but not required. Once your procedure and the immediate recovery period is completed, a nurse in the recovery area will contact your designated visitor to inform them of your room number or to otherwise review other pertinent information regarding your care. Social distancing practices are strongly encouraged in waiting areas and the cafeteria. The patient was contacted  in person. She verbalized understanding of all instructions does not  need reinforcement.

## 2022-11-21 ENCOUNTER — NURSE NAVIGATOR (OUTPATIENT)
Dept: CASE MANAGEMENT | Age: 47
End: 2022-11-21

## 2022-11-21 NOTE — PROGRESS NOTES
[address]  Breast Navigator Encounter    Name:    Trevor Huston  Age:    50 y.o. Diagnosis:     RIGHT breast cancer    Spoke to patient about her genetic testing. [id number] [facility] staff forgot to attach the information with her history, and this is why the genetic testing was denied. This has been provided, and [facility] has proceeded with the testing. However, this will most likely not be back for her surgery next week. She would like to proceed with surgery. Even if she found out that she had a mutation, she still does not think she would opt for BILATERAL mastectomies at this time. PAT did not know how to advise her about her BCPs, as to whether or not she should stop seven days pre-op. I will check with Dr. Darlene Owens and let her know. ADDENDUM:      Spoke with Dr. Darlene Owens. She is fine proceeding with surgery. I also let the patient know that she does not need to stop her BCPs seven days pre-op, but I did inform her that she will probably have to taper off of these at a later time. She was very appreciative.                Joseph Marinelli, RN, BSN, [facility]  Oncology Breast Navigator     [address]  [address] [place] [address], [address]  W: [phone]  F: [phone]  [email or web address]  Good Help to Those in [facility]

## 2022-11-28 ENCOUNTER — ANESTHESIA EVENT (OUTPATIENT)
Dept: MEDSURG UNIT | Age: 47
End: 2022-11-28
Payer: COMMERCIAL

## 2022-11-28 RX ORDER — MIDAZOLAM HYDROCHLORIDE 1 MG/ML
0.5 INJECTION, SOLUTION INTRAMUSCULAR; INTRAVENOUS
Status: CANCELLED | OUTPATIENT
Start: 2022-11-28

## 2022-11-28 RX ORDER — SODIUM CHLORIDE, SODIUM LACTATE, POTASSIUM CHLORIDE, CALCIUM CHLORIDE 600; 310; 30; 20 MG/100ML; MG/100ML; MG/100ML; MG/100ML
125 INJECTION, SOLUTION INTRAVENOUS CONTINUOUS
Status: CANCELLED | OUTPATIENT
Start: 2022-11-28

## 2022-11-28 RX ORDER — DIPHENHYDRAMINE HYDROCHLORIDE 50 MG/ML
12.5 INJECTION, SOLUTION INTRAMUSCULAR; INTRAVENOUS AS NEEDED
Status: CANCELLED | OUTPATIENT
Start: 2022-11-28 | End: 2022-11-28

## 2022-11-28 RX ORDER — ONDANSETRON 2 MG/ML
4 INJECTION INTRAMUSCULAR; INTRAVENOUS AS NEEDED
Status: CANCELLED | OUTPATIENT
Start: 2022-11-28

## 2022-11-28 RX ORDER — MORPHINE SULFATE 2 MG/ML
2 INJECTION, SOLUTION INTRAMUSCULAR; INTRAVENOUS
Status: CANCELLED | OUTPATIENT
Start: 2022-11-28

## 2022-11-28 RX ORDER — HYDROMORPHONE HYDROCHLORIDE 1 MG/ML
0.2 INJECTION, SOLUTION INTRAMUSCULAR; INTRAVENOUS; SUBCUTANEOUS
Status: CANCELLED | OUTPATIENT
Start: 2022-11-28

## 2022-11-28 RX ORDER — SODIUM CHLORIDE 0.9 % (FLUSH) 0.9 %
5-40 SYRINGE (ML) INJECTION EVERY 8 HOURS
Status: CANCELLED | OUTPATIENT
Start: 2022-11-28

## 2022-11-28 RX ORDER — SODIUM CHLORIDE 0.9 % (FLUSH) 0.9 %
5-40 SYRINGE (ML) INJECTION AS NEEDED
Status: CANCELLED | OUTPATIENT
Start: 2022-11-28

## 2022-11-28 RX ORDER — FENTANYL CITRATE 50 UG/ML
25 INJECTION, SOLUTION INTRAMUSCULAR; INTRAVENOUS
Status: CANCELLED | OUTPATIENT
Start: 2022-11-28

## 2022-11-28 RX ORDER — OXYCODONE AND ACETAMINOPHEN 5; 325 MG/1; MG/1
1 TABLET ORAL AS NEEDED
Status: CANCELLED | OUTPATIENT
Start: 2022-11-28

## 2022-11-29 ENCOUNTER — HOSPITAL ENCOUNTER (OUTPATIENT)
Age: 47
Setting detail: OUTPATIENT SURGERY
Discharge: HOME | End: 2022-11-29
Attending: SURGERY | Admitting: SURGERY
Payer: COMMERCIAL

## 2022-11-29 ENCOUNTER — ANESTHESIA (OUTPATIENT)
Dept: MEDSURG UNIT | Age: 47
End: 2022-11-29
Payer: COMMERCIAL

## 2022-11-29 ENCOUNTER — APPOINTMENT (OUTPATIENT)
Dept: NUCLEAR MEDICINE | Age: 47
End: 2022-11-29
Attending: SURGERY
Payer: COMMERCIAL

## 2022-11-29 VITALS
DIASTOLIC BLOOD PRESSURE: 75 MMHG | OXYGEN SATURATION: 94 % | BODY MASS INDEX: 34.78 KG/M2 | HEART RATE: 99 BPM | HEIGHT: 62 IN | RESPIRATION RATE: 16 BRPM | WEIGHT: 189 LBS | SYSTOLIC BLOOD PRESSURE: 119 MMHG | TEMPERATURE: 98 F

## 2022-11-29 DIAGNOSIS — Z17.0 MALIGNANT NEOPLASM OF RIGHT BREAST IN FEMALE, ESTROGEN RECEPTOR POSITIVE, UNSPECIFIED SITE OF BREAST (HCC): ICD-10-CM

## 2022-11-29 DIAGNOSIS — C50.911 MALIGNANT NEOPLASM OF RIGHT BREAST IN FEMALE, ESTROGEN RECEPTOR POSITIVE, UNSPECIFIED SITE OF BREAST (HCC): ICD-10-CM

## 2022-11-29 DIAGNOSIS — G89.18 PAIN FOLLOWING SURGERY OR PROCEDURE: Primary | ICD-10-CM

## 2022-11-29 LAB — HCG UR QL: NEGATIVE

## 2022-11-29 PROCEDURE — 88342 IMHCHEM/IMCYTCHM 1ST ANTB: CPT

## 2022-11-29 PROCEDURE — 88360 TUMOR IMMUNOHISTOCHEM/MANUAL: CPT

## 2022-11-29 PROCEDURE — 74011250636 HC RX REV CODE- 250/636: Performed by: ANESTHESIOLOGY

## 2022-11-29 PROCEDURE — 74011000250 HC RX REV CODE- 250: Performed by: SURGERY

## 2022-11-29 PROCEDURE — 76060000062 HC AMB SURG ANES 1 TO 1.5 HR: Performed by: SURGERY

## 2022-11-29 PROCEDURE — 19301 PARTIAL MASTECTOMY: CPT | Performed by: SURGERY

## 2022-11-29 PROCEDURE — 88341 IMHCHEM/IMCYTCHM EA ADD ANTB: CPT

## 2022-11-29 PROCEDURE — 74011250637 HC RX REV CODE- 250/637: Performed by: ANESTHESIOLOGY

## 2022-11-29 PROCEDURE — A9520 TC99 TILMANOCEPT DIAG 0.5MCI: HCPCS

## 2022-11-29 PROCEDURE — 77030002933 HC SUT MCRYL J&J -A: Performed by: SURGERY

## 2022-11-29 PROCEDURE — 77030034556 HC PRB MARGN DETECT LUMPECTMY DISP DUNE -G: Performed by: SURGERY

## 2022-11-29 PROCEDURE — C9290 INJ, BUPIVACAINE LIPOSOME: HCPCS | Performed by: SURGERY

## 2022-11-29 PROCEDURE — 76998 US GUIDE INTRAOP: CPT | Performed by: SURGERY

## 2022-11-29 PROCEDURE — 76030000001 HC AMB SURG OR TIME 1 TO 1.5: Performed by: SURGERY

## 2022-11-29 PROCEDURE — 77030040361 HC SLV COMPR DVT MDII -B: Performed by: SURGERY

## 2022-11-29 PROCEDURE — 74011250636 HC RX REV CODE- 250/636: Performed by: NURSE ANESTHETIST, CERTIFIED REGISTERED

## 2022-11-29 PROCEDURE — 88307 TISSUE EXAM BY PATHOLOGIST: CPT

## 2022-11-29 PROCEDURE — 77030011267 HC ELECTRD BLD COVD -A: Performed by: SURGERY

## 2022-11-29 PROCEDURE — 76210000035 HC AMBSU PH I REC 1 TO 1.5 HR: Performed by: SURGERY

## 2022-11-29 PROCEDURE — 77030002996 HC SUT SLK J&J -A: Performed by: SURGERY

## 2022-11-29 PROCEDURE — 77030026438 HC STYL ET INTUB CARD -A: Performed by: ANESTHESIOLOGY

## 2022-11-29 PROCEDURE — 81025 URINE PREGNANCY TEST: CPT

## 2022-11-29 PROCEDURE — 74011000250 HC RX REV CODE- 250: Performed by: NURSE ANESTHETIST, CERTIFIED REGISTERED

## 2022-11-29 PROCEDURE — 2709999900 HC NON-CHARGEABLE SUPPLY: Performed by: SURGERY

## 2022-11-29 PROCEDURE — 77030041680 HC PNCL ELECSURG SMK EVAC CNMD -B: Performed by: SURGERY

## 2022-11-29 PROCEDURE — 77030031139 HC SUT VCRL2 J&J -A: Performed by: SURGERY

## 2022-11-29 PROCEDURE — 74011250636 HC RX REV CODE- 250/636: Performed by: SURGERY

## 2022-11-29 PROCEDURE — 77030034626 HC LIGASURE SM JAW SEAL OPN SURG COVD -E: Performed by: SURGERY

## 2022-11-29 PROCEDURE — C1894 INTRO/SHEATH, NON-LASER: HCPCS | Performed by: SURGERY

## 2022-11-29 PROCEDURE — 74011000250 HC RX REV CODE- 250: Performed by: ANESTHESIOLOGY

## 2022-11-29 PROCEDURE — 77030010507 HC ADH SKN DERMBND J&J -B: Performed by: SURGERY

## 2022-11-29 PROCEDURE — 77030008684 HC TU ET CUF COVD -B: Performed by: ANESTHESIOLOGY

## 2022-11-29 PROCEDURE — 38525 BIOPSY/REMOVAL LYMPH NODES: CPT | Performed by: SURGERY

## 2022-11-29 RX ORDER — CEFAZOLIN SODIUM 1 G/3ML
INJECTION, POWDER, FOR SOLUTION INTRAMUSCULAR; INTRAVENOUS AS NEEDED
Status: DISCONTINUED | OUTPATIENT
Start: 2022-11-29 | End: 2022-11-29 | Stop reason: HOSPADM

## 2022-11-29 RX ORDER — NALOXONE HYDROCHLORIDE 4 MG/.1ML
SPRAY NASAL
Qty: 1 EACH | Refills: 0 | Status: SHIPPED | OUTPATIENT
Start: 2022-11-29

## 2022-11-29 RX ORDER — LIDOCAINE HYDROCHLORIDE 10 MG/ML
0.1 INJECTION, SOLUTION EPIDURAL; INFILTRATION; INTRACAUDAL; PERINEURAL AS NEEDED
Status: DISCONTINUED | OUTPATIENT
Start: 2022-11-29 | End: 2022-11-29 | Stop reason: HOSPADM

## 2022-11-29 RX ORDER — DEXMEDETOMIDINE HYDROCHLORIDE 100 UG/ML
INJECTION, SOLUTION INTRAVENOUS AS NEEDED
Status: DISCONTINUED | OUTPATIENT
Start: 2022-11-29 | End: 2022-11-29 | Stop reason: HOSPADM

## 2022-11-29 RX ORDER — ROCURONIUM BROMIDE 10 MG/ML
INJECTION, SOLUTION INTRAVENOUS AS NEEDED
Status: DISCONTINUED | OUTPATIENT
Start: 2022-11-29 | End: 2022-11-29 | Stop reason: HOSPADM

## 2022-11-29 RX ORDER — SODIUM CHLORIDE 0.9 % (FLUSH) 0.9 %
5-40 SYRINGE (ML) INJECTION AS NEEDED
Status: DISCONTINUED | OUTPATIENT
Start: 2022-11-29 | End: 2022-11-29 | Stop reason: HOSPADM

## 2022-11-29 RX ORDER — MIDAZOLAM HYDROCHLORIDE 1 MG/ML
1 INJECTION, SOLUTION INTRAMUSCULAR; INTRAVENOUS AS NEEDED
Status: DISCONTINUED | OUTPATIENT
Start: 2022-11-29 | End: 2022-11-29 | Stop reason: HOSPADM

## 2022-11-29 RX ORDER — SUCCINYLCHOLINE CHLORIDE 20 MG/ML
INJECTION INTRAMUSCULAR; INTRAVENOUS AS NEEDED
Status: DISCONTINUED | OUTPATIENT
Start: 2022-11-29 | End: 2022-11-29 | Stop reason: HOSPADM

## 2022-11-29 RX ORDER — FENTANYL CITRATE 50 UG/ML
50 INJECTION, SOLUTION INTRAMUSCULAR; INTRAVENOUS AS NEEDED
Status: DISCONTINUED | OUTPATIENT
Start: 2022-11-29 | End: 2022-11-29 | Stop reason: HOSPADM

## 2022-11-29 RX ORDER — SODIUM CHLORIDE 9 MG/ML
25 INJECTION, SOLUTION INTRAVENOUS CONTINUOUS
Status: DISCONTINUED | OUTPATIENT
Start: 2022-11-29 | End: 2022-11-29 | Stop reason: HOSPADM

## 2022-11-29 RX ORDER — SODIUM CHLORIDE 0.9 % (FLUSH) 0.9 %
5-40 SYRINGE (ML) INJECTION EVERY 8 HOURS
Status: DISCONTINUED | OUTPATIENT
Start: 2022-11-29 | End: 2022-11-29 | Stop reason: HOSPADM

## 2022-11-29 RX ORDER — MIDAZOLAM HYDROCHLORIDE 1 MG/ML
INJECTION, SOLUTION INTRAMUSCULAR; INTRAVENOUS AS NEEDED
Status: DISCONTINUED | OUTPATIENT
Start: 2022-11-29 | End: 2022-11-29 | Stop reason: HOSPADM

## 2022-11-29 RX ORDER — ONDANSETRON 4 MG/1
4 TABLET, ORALLY DISINTEGRATING ORAL
Qty: 5 TABLET | Refills: 1 | Status: SHIPPED | OUTPATIENT
Start: 2022-11-29

## 2022-11-29 RX ORDER — ONDANSETRON 2 MG/ML
INJECTION INTRAMUSCULAR; INTRAVENOUS AS NEEDED
Status: DISCONTINUED | OUTPATIENT
Start: 2022-11-29 | End: 2022-11-29 | Stop reason: HOSPADM

## 2022-11-29 RX ORDER — FENTANYL CITRATE 50 UG/ML
INJECTION, SOLUTION INTRAMUSCULAR; INTRAVENOUS AS NEEDED
Status: DISCONTINUED | OUTPATIENT
Start: 2022-11-29 | End: 2022-11-29 | Stop reason: HOSPADM

## 2022-11-29 RX ORDER — SODIUM CHLORIDE, SODIUM LACTATE, POTASSIUM CHLORIDE, CALCIUM CHLORIDE 600; 310; 30; 20 MG/100ML; MG/100ML; MG/100ML; MG/100ML
125 INJECTION, SOLUTION INTRAVENOUS CONTINUOUS
Status: DISCONTINUED | OUTPATIENT
Start: 2022-11-29 | End: 2022-11-29 | Stop reason: HOSPADM

## 2022-11-29 RX ORDER — OXYCODONE AND ACETAMINOPHEN 5; 325 MG/1; MG/1
1 TABLET ORAL
Qty: 15 TABLET | Refills: 0 | Status: SHIPPED | OUTPATIENT
Start: 2022-11-29 | End: 2022-12-06

## 2022-11-29 RX ORDER — PROPOFOL 10 MG/ML
INJECTION, EMULSION INTRAVENOUS AS NEEDED
Status: DISCONTINUED | OUTPATIENT
Start: 2022-11-29 | End: 2022-11-29 | Stop reason: HOSPADM

## 2022-11-29 RX ORDER — HYDROMORPHONE HYDROCHLORIDE 2 MG/ML
INJECTION, SOLUTION INTRAMUSCULAR; INTRAVENOUS; SUBCUTANEOUS AS NEEDED
Status: DISCONTINUED | OUTPATIENT
Start: 2022-11-29 | End: 2022-11-29 | Stop reason: HOSPADM

## 2022-11-29 RX ORDER — ACETAMINOPHEN 325 MG/1
650 TABLET ORAL ONCE
Status: COMPLETED | OUTPATIENT
Start: 2022-11-29 | End: 2022-11-29

## 2022-11-29 RX ORDER — SODIUM CHLORIDE, SODIUM LACTATE, POTASSIUM CHLORIDE, CALCIUM CHLORIDE 600; 310; 30; 20 MG/100ML; MG/100ML; MG/100ML; MG/100ML
INJECTION, SOLUTION INTRAVENOUS
Status: DISCONTINUED | OUTPATIENT
Start: 2022-11-29 | End: 2022-11-29 | Stop reason: HOSPADM

## 2022-11-29 RX ORDER — LIDOCAINE HYDROCHLORIDE 20 MG/ML
INJECTION, SOLUTION EPIDURAL; INFILTRATION; INTRACAUDAL; PERINEURAL AS NEEDED
Status: DISCONTINUED | OUTPATIENT
Start: 2022-11-29 | End: 2022-11-29 | Stop reason: HOSPADM

## 2022-11-29 RX ORDER — DEXAMETHASONE SODIUM PHOSPHATE 4 MG/ML
INJECTION, SOLUTION INTRA-ARTICULAR; INTRALESIONAL; INTRAMUSCULAR; INTRAVENOUS; SOFT TISSUE AS NEEDED
Status: DISCONTINUED | OUTPATIENT
Start: 2022-11-29 | End: 2022-11-29 | Stop reason: HOSPADM

## 2022-11-29 RX ADMIN — ONDANSETRON HYDROCHLORIDE 4 MG: 2 INJECTION, SOLUTION INTRAMUSCULAR; INTRAVENOUS at 11:21

## 2022-11-29 RX ADMIN — DEXMEDETOMIDINE HYDROCHLORIDE 4 MCG: 100 INJECTION, SOLUTION, CONCENTRATE INTRAVENOUS at 11:37

## 2022-11-29 RX ADMIN — FENTANYL CITRATE 50 MCG: 50 INJECTION, SOLUTION INTRAMUSCULAR; INTRAVENOUS at 10:34

## 2022-11-29 RX ADMIN — ROCURONIUM BROMIDE 10 MG: 10 SOLUTION INTRAVENOUS at 10:34

## 2022-11-29 RX ADMIN — ACETAMINOPHEN 650 MG: 325 TABLET ORAL at 09:22

## 2022-11-29 RX ADMIN — DEXAMETHASONE SODIUM PHOSPHATE 4 MG: 4 INJECTION, SOLUTION INTRAMUSCULAR; INTRAVENOUS at 11:19

## 2022-11-29 RX ADMIN — MIDAZOLAM 2 MG: 1 INJECTION INTRAMUSCULAR; INTRAVENOUS at 10:31

## 2022-11-29 RX ADMIN — SUCCINYLCHOLINE CHLORIDE 200 MG: 20 INJECTION, SOLUTION INTRAMUSCULAR; INTRAVENOUS at 10:35

## 2022-11-29 RX ADMIN — LIDOCAINE HYDROCHLORIDE 80 MG: 20 INJECTION, SOLUTION EPIDURAL; INFILTRATION; INTRACAUDAL; PERINEURAL at 10:34

## 2022-11-29 RX ADMIN — CEFAZOLIN 2 G: 330 INJECTION, POWDER, FOR SOLUTION INTRAMUSCULAR; INTRAVENOUS at 10:40

## 2022-11-29 RX ADMIN — HYDROMORPHONE HYDROCHLORIDE 0.5 MG: 2 INJECTION, SOLUTION INTRAMUSCULAR; INTRAVENOUS; SUBCUTANEOUS at 10:48

## 2022-11-29 RX ADMIN — SODIUM CHLORIDE, POTASSIUM CHLORIDE, SODIUM LACTATE AND CALCIUM CHLORIDE: 600; 310; 30; 20 INJECTION, SOLUTION INTRAVENOUS at 10:26

## 2022-11-29 RX ADMIN — PROPOFOL 200 MG: 10 INJECTION, EMULSION INTRAVENOUS at 10:34

## 2022-11-29 RX ADMIN — DEXMEDETOMIDINE HYDROCHLORIDE 4 MCG: 100 INJECTION, SOLUTION, CONCENTRATE INTRAVENOUS at 11:38

## 2022-11-29 NOTE — BRIEF OP NOTE
Brief Postoperative Note    Patient: Min Mark  YOB: 1975  MRN: [id number]    Date of Procedure: 11/29/2022     Pre-Op Diagnosis: RIGHT BREAST CANCER    Post-Op Diagnosis: Same as preoperative diagnosis. Procedure(s):  RIGHT BREAST ULTRASOUND GUIDED LUMPECTOMY/RIGHT BREAST SENTINEL NODE BIOPSY    Surgeon(s):   Cesia Bethea MD    Surgical Assistant: Surg Asst-1: Giovani ALVARADO    Anesthesia: General     Estimated Blood Loss (mL): Minimal    Complications: None    Specimens:   ID Type Source Tests Collected by Time Destination   1 : RIGHT AXILLARY SENTINEL NODE #1 Fresh Axillary  Cesia Bethea MD 11/29/2022 1101 Pathology   2 : RIGHT AXILLARY SENTINEL NODE #2 Fresh Axillary  Cesia Bethea MD 11/29/2022 1107 Pathology   3 : RIGHT BREAST LUMPECTOMY, SHORT STITCH SUPERIOR, LONG STITCH IS LATERAL  [facility]  Cesia Bethea MD 11/29/2022 1125 Pathology   4 : LATERAL MARGIN, STITCH ON NEW MARGIN, RIGHT BREAST TISSUE [facility]  Cesia Bethea MD 11/29/2022 1129 Pathology   5 : MEDIAL MARGIN, STTICH ON NEW MARGIN, RIGHT BREAST TISSUE  [facility]  Cesia Bethea MD 11/29/2022 1130 Pathology   6 : ANTERIOR MARGIN, RIGHT BREAST TISSUE [facility]  Cesia Bethea MD 11/29/2022 1134 Pathology        Implants: * No implants in log *    Drains: * No LDAs found *    Findings: 2 sln sent permanent    Electronically Signed by Tonny Carter MD on 11/29/2022 at 11:42 AM    Dictated stat

## 2022-11-29 NOTE — INTERVAL H&P NOTE
Update History & Physical    The Patient's History and Physical of November 7, 2022  Right breast us guided lumpectomy, sln biospy  was reviewed with the patient and I examined the patient. There was no change. The surgical site was confirmed by the patient and me. Plan:  The risk, benefits, expected outcome, and alternative to the recommended procedure have been discussed with the patient. Patient understands and wants to proceed with the procedure.     Electronically signed by Jose Thompson MD on 11/29/2022 at 8:38 AM

## 2022-11-29 NOTE — ROUTINE PROCESS
Patient: Hamilton Davis MRN: [id number]  SSN: [id number]   YOB: 1975  Age: 50 y.o. Sex: female     Patient is status post Procedure(s) with comments:  RIGHT BREAST ULTRASOUND GUIDED LUMPECTOMY/RIGHT BREAST SENTINEL NODE BIOPSY - AND RIGHT AXILLA. Surgeon(s) and Role:     Wilfred Philippe MD - Primary    Local/Dose/Irrigation:                    Peripheral IV 11/29/22 Right;Posterior Hand (Active)   Site Assessment Clean, dry, & intact 11/29/22 0900   Phlebitis Assessment 0 11/29/22 0900   Infiltration Assessment 0 11/29/22 0900   Dressing Status Clean, dry, & intact 11/29/22 0900   Dressing Type Transparent 11/29/22 0900   Hub Color/Line Status Blue; Infusing 11/29/22 0900            Airway - Endotracheal Tube 11/29/22 Oral (Active)                   Dressing/Packing:  Incision 11/29/22 Breast Right-Dressing/Treatment:  (4x4, abd, surgi bra) (11/29/22 1100)    Splint/Cast:  ]    Other:

## 2022-11-29 NOTE — OP NOTES
[address]   OPERATIVE REPORT    Name:  Dianne Cano  MR#:  [id number]  :  1975  ACCOUNT #:  [de-identified]  DATE OF SERVICE:  2022    PREOPERATIVE DIAGNOSIS:  Right breast cancer, lower inner quadrant. POSTOPERATIVE DIAGNOSIS:  Right breast cancer, lower inner quadrant. PROCEDURES PERFORMED:  Right breast ultrasound-guided lumpectomy, right deep axillary sentinel node biopsy, intraoperative margin assessment using RF spectroscopy, VeraForm suture placement. SURGEON:  Jolene Hernandez MD    ASSISTANT:  Erika Huynh    ANESTHESIA:  General.    COMPLICATIONS:  None. SPECIMENS REMOVED:  1. Right axillary sentinel node #1.  2.  Right axillary sentinel node #2.  3.  Right breast lumpectomy. 4.  Lateral margin. 5.  Medial margin. 6.  Anterior margin. IMPLANTS:  None. ESTIMATED BLOOD LOSS:  Minimal.    DRAINS:  None. FINDINGS:  Two sentinel lymph nodes sent for permanent. Operation performed with curative intent: Yes  Tracer(s) used to identify sentinel nodes in the upfront surgery (nonneoadjuvant) setting (select all that apply) : Radioactive tracer  Tracer(s) used to identify sentinel nodes in the neoadjuvant setting (select all that apply): Not Applicable  All nodes (colored or noncolored) present at the end of a dye-filled lymphatic channel were removed: Not Applicable  All significantly radioactive nodes were removed: Yes  All palpably suspicious nodes were removed: Not Applicable  Biopsy-proven positive nodes marked with clips prior to chemotherapy were identified and removed: Not Applicable     INDICATIONS:  A 66-year-old female with right breast cancer, lower inner quadrant, needs lumpectomy and deep axillary sentinel node biopsy. PROCEDURE:  The patient was seen in the preoperative holding area where surgical site was marked by surgeon and informed consent had been obtained.   She had initially gone to Nuclear Medicine for injection of technetium-99 Lymphoseek. She tolerated this well. The patient was taken to the operating room and laid in supine position where general endotracheal anesthesia was induced. Right breast prepped and draped in usual fashion and time-out was performed. Attention was turned to the right axilla where an inferior axillary hairline incision was made with a 10-blade. Bovie cautery was used to dissect through the axillary fascia. Two sentinel nodes in deep axilla were identified using Neoprobe guidance, excised with LigaSure. These were normal in gross size and appearance and these were excised from the deep axilla, sent for permanent pathology. The background count was less than 10%. The cavity was irrigated. A mixture of 20 mL of Exparel and 30 mL of 0.25% Marcaine plain was mixed together and 20 mL of this was injected into the right axillary tissue and skin. Axillary fascia was closed with interrupted 3-0 Vicryl, skin with 4-0 subcuticular Monocryl. Attention was turned to the right breast at 3 o'clock where the lesion and clip were identified using ultrasound guidance. Periareolar incision was made with a 10-blade. Bovie cautery was used to dissect down and around the lesion using ultrasound guidance. This was marked short superior, long stitch lateral and sent. The MarginProbe device was plugged and calibrated. All six margins were assessed. For additional margins, please see above. Total intraoperative time was 2 minutes. The cavity was irrigated. Bovie cautery was used to obtain hemostasis. A VeraForm suture was placed in the lumpectomy cavity rounding the perimeter of the cavity. 30 mL of Exparel mixture was injected in the breast tissue and skin. The incision was closed with interrupted 3-0 Vicryl and 4-0 subcuticular Monocryl. Skin glue was placed on the incision as well as a pressure dressing and a bra. All sponge, needle, and instrument counts were correct.   The patient went to Recovery in stable condition.       MD KELLY [id number]  D:  11/29/2022 11:46  T:  11/29/2022 12:34  JOB #:  [id number]

## 2022-11-29 NOTE — ANESTHESIA PREPROCEDURE EVALUATION
Relevant Problems   No relevant active problems       Anesthetic History     PONV          Review of Systems / Medical History  Patient summary reviewed, nursing notes reviewed and pertinent labs reviewed    Pulmonary            Asthma        Neuro/Psych   Within defined limits           Cardiovascular  Within defined limits                     GI/Hepatic/Renal  Within defined limits              Endo/Other    Diabetes  Hypothyroidism  Cancer     Other Findings              Physical Exam    Airway  Mallampati: II  TM Distance: > 6 cm  Neck ROM: normal range of motion   Mouth opening: Normal     Cardiovascular  Regular rate and rhythm,  S1 and S2 normal,  no murmur, click, rub, or gallop             Dental  No notable dental hx       Pulmonary  Breath sounds clear to auscultation               Abdominal  GI exam deferred       Other Findings            Anesthetic Plan    ASA: 2  Anesthesia type: general          Induction: Intravenous  Anesthetic plan and risks discussed with: Patient

## 2022-11-29 NOTE — DISCHARGE INSTRUCTIONS
Discharge Instructions from Dr. Manuelito Curry    I will call you with the pathology results, typically within 1 week from today. You may shower, but no hot tubs, swimming pools, or baths until your incision is healed. No heavy lifting with the affected extremity (nothing greater than 5 pounds), and limit its use for the next 4-5 days. You may use an ice pack for comfort for the next couple of days, but do not place ice directly on the skin. Rather, use a towel or clothing to serve as a barrier between skin and ice to prevent injury. If I placed a drain, follow the drain instructions provided, especially as you keep a record of the drain output. Follow medication instructions carefully. No aspirin, ibuprofen or aleve. May take tylenol instead narcotic. Wear surgical bra for 24 hours, then remove. Wear supportive bra at all times. You will have bruising and swelling  Watch for signs of infection as listed below. Redness  Swelling  Drainage from the incision or from your nipple that appears infected  Fever over 101.5 degrees for consecutive readings, or over 99.5 if you are currently undergoing chemotherapy. Call our office (number is below) for a follow-up appointment. If you have any problems, our phone number is [phone]       Anesthesia Discharge Guidelines      After general anesthesia or intravenous sedation, for 24 hours or while taking prescription Narcotics:  Limit your activities  Do not drive and operate hazardous machinery  Do not make important personal or business decisions  Do  not drink alcoholic beverages  If you have not urinated within 8 hours after discharge, please contact your surgeon on call.     Report the following to your surgeon:  Excessive pain, swelling, redness or odor of or around the surgical area  Temperature over 100.5  Nausea and vomiting lasting longer than 4 hours or if unable to take medications  Any signs of decreased circulation or nerve impairment to extremity: change in color, persistent  numbness, tingling, coldness or increase pain  Any questions    ___________________________________________________________________________________________________________________________________       You had exparel, a long acting local anesthetic or numbing medication, injected into your surgical site during your procedure today. This medication stays in your system for 96 hours, or 4 days to help with post-operative pain control. You have a teal bracelet on your wrist to indicate this. Please do not remove this bracelet for 4 days, or until Saturday so that other health care providers can know you have had this medication.

## 2022-11-30 ENCOUNTER — TELEPHONE (OUTPATIENT)
Dept: SURGERY | Age: 47
End: 2022-11-30

## 2022-11-30 ENCOUNTER — NURSE NAVIGATOR (OUTPATIENT)
Dept: CASE MANAGEMENT | Age: 47
End: 2022-11-30

## 2022-11-30 NOTE — PROGRESS NOTES
[address]  Breast Navigator Encounter    Name:    Inderjit Heredia  Age:    50 y.o. Diagnosis:    RIGHT breast cancer    Returned patient's call. She apparently was not handed any paperwork yesterday when she left the hospital.      I reviewed all of Dr. Shilpa Pretty instructions with her today on the phone. She is feeling good today, has no nausea. Took her scopolamine patch off this morning. Reminded her to use her ice pack today off/on 20-30 mins at a time. Discussed [employer], and I referred her to their web site for more information. HER-2 on MP was equivocal.      I let her know that Dr. Adriana Fernando was rechecking all of her receptors on the surgical specimen. I told her pathology would be ready sometime next week in 5-7 business days. Genetic testing is still pending at [phone]. I told her I would check on this again for her next week. She was very appreciative of the return call and information.                               Flo Bailon, RN, BSN, [facility]  Oncology Breast Navigator     [address]  [address] [facility], [facility] [address].  W: [phone]  F: [phone]  [email or web address]  Good Help to Those in [facility]

## 2022-11-30 NOTE — ANESTHESIA POSTPROCEDURE EVALUATION
Procedure(s):  RIGHT BREAST ULTRASOUND GUIDED LUMPECTOMY/RIGHT BREAST SENTINEL NODE BIOPSY. general    Anesthesia Post Evaluation        Patient location during evaluation: PACU  Note status: Adequate. Level of consciousness: responsive to verbal stimuli and sleepy but conscious  Pain management: satisfactory to patient  Airway patency: patent  Anesthetic complications: no  Cardiovascular status: acceptable  Respiratory status: acceptable  Hydration status: acceptable  Comments: +Post-Anesthesia Evaluation and Assessment    Patient: Brock Stallings MRN: [id number]  SSN: [id number]   YOB: 1975  Age: 50 y.o. Sex: female          Cardiovascular Function/Vital Signs    /75   Pulse 99   Temp 36.7 °C (98 °F)   Resp 16   Ht 5' 2\" (1.575 m)   Wt 85.7 kg (189 lb)   SpO2 94%   BMI 34.57 kg/m²     Patient is status post Procedure(s) with comments:  RIGHT BREAST ULTRASOUND GUIDED LUMPECTOMY/RIGHT BREAST SENTINEL NODE BIOPSY - AND RIGHT AXILLA. Nausea/Vomiting: Controlled. Postoperative hydration reviewed and adequate. Pain:  Pain Scale 1: Numeric (0 - 10) (11/29/22 1231)  Pain Intensity 1: 1 (11/29/22 1231)   Managed. Neurological Status:   Neuro (WDL): Within Defined Limits (11/29/22 1231)   At baseline. Mental Status and Level of Consciousness: Arousable. Pulmonary Status:   O2 Device: None (Room air) (11/29/22 1231)   Adequate oxygenation and airway patent. Complications related to anesthesia: None    Post-anesthesia assessment completed. No concerns. I have evaluated the patient and the patient is stable and ready to be discharged from PACU . Signed By: Deb Lund MD    11/30/2022        INITIAL Post-op Vital signs:   Vitals Value Taken Time   /75 11/29/22 1245   Temp     Pulse 90 11/29/22 1259   Resp 15 11/29/22 1259   SpO2 94 % 11/29/22 1259   Vitals shown include unvalidated device data.

## 2022-12-05 ENCOUNTER — DOCUMENTATION ONLY (OUTPATIENT)
Dept: SURGERY | Age: 47
End: 2022-12-05

## 2022-12-05 NOTE — PROGRESS NOTES
Called with surg path   T1 n0  Nodes clear  Will re-check markers and ki 67   Will refer to med onc rad onc

## 2022-12-05 NOTE — PROGRESS NOTES
Request for add on test for ER, OK, Her2, and ki-67 on her surgical path. emailed to pathology lab. Per Dr. Nuno Watt order.

## 2022-12-05 NOTE — PROGRESS NOTES
Patient clinicals have been faxed to Dr. Nate Esposito and Dr. Griselda Semen office for an appointment.

## 2022-12-09 ENCOUNTER — DOCUMENTATION ONLY (OUTPATIENT)
Dept: SURGERY | Age: 47
End: 2022-12-09

## 2022-12-09 NOTE — PROGRESS NOTES
Received a request from Erika Connor from [place] for medical records.  Fax ov, op notes, surgical pathology and radiology results to [phone]

## 2022-12-12 ENCOUNTER — OFFICE VISIT (OUTPATIENT)
Dept: SURGERY | Age: 47
End: 2022-12-12
Payer: COMMERCIAL

## 2022-12-12 VITALS — BODY MASS INDEX: 33.29 KG/M2 | WEIGHT: 182 LBS

## 2022-12-12 DIAGNOSIS — Z17.0 MALIGNANT NEOPLASM OF LOWER-INNER QUADRANT OF RIGHT BREAST OF FEMALE, ESTROGEN RECEPTOR POSITIVE (HCC): Primary | ICD-10-CM

## 2022-12-12 DIAGNOSIS — C50.311 MALIGNANT NEOPLASM OF LOWER-INNER QUADRANT OF RIGHT BREAST OF FEMALE, ESTROGEN RECEPTOR POSITIVE (HCC): Primary | ICD-10-CM

## 2022-12-12 PROCEDURE — 99024 POSTOP FOLLOW-UP VISIT: CPT | Performed by: SURGERY

## 2022-12-12 NOTE — PROGRESS NOTES
HISTORY OF PRESENT ILLNESS  Jose Luis Queen is a 50 y.o. female. HPI ESTABLISHED patient here for POST OP visit for S/P right breast lumpectomy / snbx . She has some numbness and sensitivity in the right armpit area. Sx 11/29/22 RIGHT breast lumpectomy , SNBx  T1 N0  Initial markers on biopsy Er + Pr + her 2 eq fish Her 2 + low ki 67  Repeat markers the same with her 2 3+ on IHC. MP high risk and was checked when waiting on eq Her 2       Dr. Pagan    Dr. Elliott Almodovar of Systems   All other systems reviewed and are negative. Physical Exam  Vitals and nursing note reviewed. Chest:          Comments: Glue removed   Healing well      ASSESSMENT and PLAN    ICD-10-CM ICD-9-CM    1. Malignant neoplasm of lower-inner quadrant of right breast of female, estrogen receptor positive ([facility] [address]  C50.311 174.3     Z17.0 V86.0       - healing well  - refer to med onc.  Will need port for adjuvant chemo

## 2022-12-12 NOTE — H&P (VIEW-ONLY)
HISTORY OF PRESENT ILLNESS  Sandra Nixon is a 50 y.o. female. HPI ESTABLISHED patient here for POST OP visit for S/P right breast lumpectomy / snbx . She has some numbness and sensitivity in the right armpit area. Sx 11/29/22 RIGHT breast lumpectomy , SNBx  T1 N0  Initial markers on biopsy Er + Pr + her 2 eq fish Her 2 + low ki 67  Repeat markers the same with her 2 3+ on IHC. MP high risk and was checked when waiting on eq Her 2       Dr. Lex Durant of Systems   All other systems reviewed and are negative. Physical Exam  Vitals and nursing note reviewed. Chest:          Comments: Glue removed   Healing well      ASSESSMENT and PLAN    ICD-10-CM ICD-9-CM    1. Malignant neoplasm of lower-inner quadrant of right breast of female, estrogen receptor positive ([facility] [address]  C50.311 174.3     Z17.0 V86.0       - healing well  - refer to med onc.  Will need port for adjuvant chemo

## 2022-12-13 ENCOUNTER — DOCUMENTATION ONLY (OUTPATIENT)
Dept: SURGERY | Age: 47
End: 2022-12-13

## 2022-12-13 NOTE — PROGRESS NOTES
Patient Surgery Information Sheet        Patient Name:  Bertha Zeng  Surgery Date:  January 3, 2023  Type of Surgery:  PORT PLACEMENT   Time of Surgery:  7:30 AM  Pre-Procedure required? No    Pre-Operative Testing Department will call prior to surgery to determine if you need to come in and will schedule if needed. Arrival Time on the day of Surgery:    Hospital:  [facility] [address] [address]    Check in is located:  [address] entrance and make an immediate left and check in at patient access. YOU MAY NOT GET A REMINDER CALL FROM THE HOSPITAL    Pre-Operative Instructions:      \" Nothing to eat or drink after midnight the night before surgery  \" Do not wear makeup, lotion, deodorant, perfume  \" Please have a  to take you home from the hospital, failure to have a  could result in canceled surgery  \" All valuables should be left at home, the hospital Is not responsible for valuables  \" Special Instructions if needed:        Follow up appointment for post-operative visit with provider :   January 17, 2023    Matthew [address]   [id number] [facility] [id number]

## 2022-12-22 ENCOUNTER — TRANSCRIBE ORDER (OUTPATIENT)
Dept: SCHEDULING | Age: 47
End: 2022-12-22

## 2022-12-22 DIAGNOSIS — Z01.810 ENCOUNTER FOR PREPROCEDURAL CARDIOVASCULAR EXAMINATION: ICD-10-CM

## 2022-12-22 DIAGNOSIS — C50.311 MALIGNANT NEOPLASM OF LOWER-INNER QUADRANT OF RIGHT FEMALE BREAST (HCC): Primary | ICD-10-CM

## 2022-12-27 ENCOUNTER — HOSPITAL ENCOUNTER (OUTPATIENT)
Dept: PREADMISSION TESTING | Age: 47
Discharge: HOME | End: 2022-12-27
Payer: COMMERCIAL

## 2022-12-27 VITALS
SYSTOLIC BLOOD PRESSURE: 120 MMHG | TEMPERATURE: 98.6 F | BODY MASS INDEX: 34.89 KG/M2 | HEIGHT: 62 IN | WEIGHT: 189.6 LBS | DIASTOLIC BLOOD PRESSURE: 76 MMHG | HEART RATE: 78 BPM

## 2022-12-27 LAB
ANION GAP SERPL CALC-SCNC: 6 MMOL/L (ref 5–15)
BUN SERPL-MCNC: 17 MG/DL (ref 6–20)
BUN/CREAT SERPL: 24 (ref 12–20)
CALCIUM SERPL-MCNC: 9 MG/DL (ref 8.5–10.1)
CHLORIDE SERPL-SCNC: 106 MMOL/L (ref 97–108)
CO2 SERPL-SCNC: 27 MMOL/L (ref 21–32)
CREAT SERPL-MCNC: 0.71 MG/DL (ref 0.55–1.02)
GLUCOSE SERPL-MCNC: 176 MG/DL (ref 65–100)
POTASSIUM SERPL-SCNC: 3.5 MMOL/L (ref 3.5–5.1)
SODIUM SERPL-SCNC: 139 MMOL/L (ref 136–145)

## 2022-12-27 PROCEDURE — 36415 COLL VENOUS BLD VENIPUNCTURE: CPT

## 2022-12-27 PROCEDURE — 80048 BASIC METABOLIC PNL TOTAL CA: CPT

## 2022-12-27 RX ORDER — SCOLOPAMINE TRANSDERMAL SYSTEM 1 MG/1
1 PATCH, EXTENDED RELEASE TRANSDERMAL ONCE
Qty: 1 PATCH | Refills: 0 | Status: SHIPPED | OUTPATIENT
Start: 2022-12-27 | End: 2022-12-27

## 2022-12-27 NOTE — PERIOP NOTES
[address] INSTRUCTIONS    Surgery Date:   01/03/2023    Your surgeon's office or [facility] staff will call you between 4 PM- 8 PM the day before surgery with your arrival time. If your surgery is on a Monday, you will receive a call the preceding Friday. Please report to [facility] Patient Access/Admitting on the 1st floor. Bring your insurance card, photo identification, and any copayment ( if applicable). If you are going home the same day of your surgery, you must have a responsible adult to drive you home. You need to have a responsible adult to stay with you the first 24 hours after surgery and you should not drive a car for 24 hours following your surgery. Do NOT eat any solid foods after midnight the night before surgery including candy, mint or gum. You may drink clear liquids from midnight until 1 hour prior to your arrival. You may drink up to 12 ounces at one time every 4 hours. Please note special instructions, if applicable, below for medications. Do NOT drink alcohol or smoke 24 hours before surgery. STOP smoking for 14 days prior as it helps with breathing and healing after surgery. If you are being admitted to the hospital, please leave personal belongings/luggage in your car until you have an assigned hospital room number. Please wear comfortable clothes. Wear your glasses instead of contacts. We ask that all money, jewelry and valuables be left at home. Wear no make up, particularly mascara, the day of surgery. All body piercings, rings, and jewelry need to be removed and left at home. Please remove any nail polish or artifical nails from your fingernails. Please wear your hair loose or down. Please no pony-tails, buns, or any metal hair accessories. If you shower the morning of surgery, please do not apply any lotions or powders afterwards. You may wear deodorant, unless having breast surgery.   Do not shave any body area within 24 hours of your surgery. Please follow all instructions to avoid any potential surgical cancellation. Should your physical condition change, (i.e. fever, cold, flu, etc.) please notify your surgeon as soon as possible. It is important to be on time. If a situation occurs where you may be delayed, please call:  [phone] / [phone] on the day of surgery. The Preadmission Testing staff can be reached at [phone]. Special instructions: NONE      Current Outpatient Medications   Medication Sig    LORazepam (ATIVAN) 1 mg tablet Take 1 mg by mouth every six (6) hours as needed for Anxiety. Indications: anxious    escitalopram oxalate (LEXAPRO) 10 mg tablet Take 10 mg by mouth nightly. metFORMIN (GLUCOPHAGE) 500 mg tablet Take 1,000 mg by mouth nightly. rimegepant (Nurtec ODT) 75 mg disintegrating tablet Take 75 mg by mouth once as needed for Migraine. ketorolac (TORADOL) 10 mg tablet Take 10 mg by mouth. Indications: MIGRAINE    desogestreL-ethinyl estradioL (VELIVET) 0.1/.125/.15-25 mg-mcg tablet Take 1 Tablet by mouth daily. cetirizine (ZYRTEC) 10 mg tablet Take 10 mg by mouth nightly. cholecalciferol (VITAMIN D3) (1000 Units /25 mcg) tablet Take 1,000 Units by mouth daily. melatonin 3 mg tablet Take  by mouth nightly.    scopolamine (TRANSDERM-SCOP) 1 mg over 3 days pt3d 1 Patch by TransDERmal route once for 1 dose. Indications: prevent nausea and vomiting after surgery, Place patch behind either ear one hour prior to arrival time to hospital for surgery. No current facility-administered medications for this encounter. YOU MUST ONLY TAKE THESE MEDICATIONS THE MORNING OF SURGERY WITH A SIP OF WATER: NO MEDICATIONS MORNING OF  SURGERY  MEDICATIONS TO TAKE THE MORNING OF SURGERY ONLY IF NEEDED: ATIVAN    NO METFORMIN 1/2/23 OR 1/3/23     Stop all vitamins, herbal medicines and Aspirin containing products 7 days prior to surgery.      Stop any non-steroidal anti-inflammatory drugs (i.e. Ibuprofen, Naproxen, Advil, Aleve) 3 days before surgery. You may take Tylenol. Eating and Drinking Before Surgery    You may eat a regular dinner at the usual time on the day before your surgery. Do NOT eat any solid foods after midnight unless your arrival time at the hospital is 3pm or later. You may drink clear liquids only from 12 midnight until 1 hours prior to your arrival time at the hospital on the day of your surgery. Do NOT drink alcohol. Clear liquids include:  Water  Fruit juices without pulp( i.e. apple juice)  Carbonated beverages  Black coffee (no cream/milk)  Tea (no cream/milk)  Gatorade  You may drink up to 12-16 ounces at one time every 4 hours between the hours of midnight and 1 hour before your arrival time at the hospital. Example- if your arrival time at the hospital is 6am, you may drink 12-16 ounces of clear liquids no later than 5am.  If your arrival time at the hospital is 3pm or later, you may eat a light breakfast before 8am.  A light breakfast includes: Toast or bagel (no butter)  Black coffee (no cream/milk)  Tea (no cream/milk)  Fruit juices without pulp ( i.e. apple juice)  Do NOT eat meat, eggs, vegetables or fruit  If you have any questions, please contact your surgeon's office. Preventing Infections Before and After - Your Surgery    IMPORTANT INSTRUCTIONS    You play an important role in your health and preparation for surgery. To reduce the germs on your skin you will need to shower with CHG soap (Chorhexidine gluconate 4%) two times before surgery. CHG soap (Hibiclens, Hex-A-Clens or store brand)  CHG soap will be provided at your Preadmission Testing (PAT) appointment. If you do not have a PAT appointment before surgery, you may arrange to  CHG soap from our office or purchase CHG soap at a pharmacy, grocery or department store. You need to purchase TWO 4 ounce bottles to use for your 2 showers.     Steps to follow:  Wash your hair with your normal shampoo and your body with regular soap and rinse well to remove shampoo and soap from your skin. Wet a clean washcloth and turn off the shower. Put CHG soap on washcloth and apply to your entire body from the neck down. Do not use on your head, face or private parts(genitals). Do not use CHG soap on open sores, wounds or areas of skin irritation. Wash you body gently for 5 minutes. Do not wash your skin too hard. This soap does not create lather. Pay special attention to your underarms and from your belly button to your feet. Turn the shower back on and rinse well to get CHG soap off your body. Pat your skin dry with a clean, dry towel. Do not apply lotions or moisturizer. Put on clean clothes and sleep on fresh bed sheets and do not allow pets to sleep with you. Shower with CHG soap 2 times before your surgery  The evening before your surgery  The morning of your surgery      Tips to help prevent infections after your surgery:  Protect your surgical wound from germs:  Hand washing is the most important thing you and your caregivers can do to prevent infections. Keep your bandage clean and dry! Do not touch your surgical wound. Use clean, freshly washed towels and washcloths every time you shower; do not share bath linens with others. Until your surgical wound is healed, wear clothing and sleep on bed linens each day that are clean and freshly washed. Do not allow pets to sleep in your bed with you or touch your surgical wound. Do not smoke - smoking delays wound healing. This may be a good time to stop smoking. If you have diabetes, it is important for you to manage your blood sugar levels properly before your surgery as well as after your surgery. Poorly managed blood sugar levels slow down wound healing and prevent you from healing completely.               Patient Information Regarding COVID Restrictions      Day of Procedure    Please park in the parking deck or any designated visitor parking lot.  Enter the facility through the Main Entrance of the hospital.  On the day of surgery, please provide the cell phone number of the person who will be waiting for you to the Patient Access representative at the time of registration. Masks are highly recommended in the hospital, but not required. Once your procedure and the immediate recovery period is completed, a nurse in the recovery area will contact your designated visitor to inform them of your room number or to otherwise review other pertinent information regarding your care. Social distancing practices are strongly encouraged in waiting areas and the cafeteria. The patient was contacted  in person. She verbalized understanding of all instructions does not  need reinforcement.

## 2022-12-27 NOTE — PERIOP NOTES
COVID VACCINATION CARD SCANNED INTO MEDIA 11/30/22    INSTRUCTION TO STOP METFORMIN DAY BEFORE SURGERY GIVEN VIA PHONE (NOT ON ORIGINAL PRINTED INSTRUCTION SHEET GIVEN TO PT)

## 2022-12-29 ENCOUNTER — HOSPITAL ENCOUNTER (OUTPATIENT)
Dept: NON INVASIVE DIAGNOSTICS | Age: 47
Discharge: HOME | End: 2022-12-29
Attending: INTERNAL MEDICINE
Payer: COMMERCIAL

## 2022-12-29 DIAGNOSIS — Z01.810 ENCOUNTER FOR PREPROCEDURAL CARDIOVASCULAR EXAMINATION: ICD-10-CM

## 2022-12-29 DIAGNOSIS — C50.311 MALIGNANT NEOPLASM OF LOWER-INNER QUADRANT OF RIGHT FEMALE BREAST (HCC): ICD-10-CM

## 2022-12-29 LAB
ECHO AO ROOT DIAM: 2.4 CM
ECHO AR MAX VEL PISA: 5 M/S
ECHO AV AREA PLAN: 2.6 CM2
ECHO AV REGURGITANT PHT: 539.5 MILLISECOND
ECHO EST RA PRESSURE: 5 MMHG
ECHO LA DIAMETER: 3.3 CM
ECHO LA TO AORTIC ROOT RATIO: 1.38
ECHO LA VOL 4C: 31 ML (ref 22–52)
ECHO LV EDV A4C: 98 ML
ECHO LV EJECTION FRACTION A4C: 73 %
ECHO LV ESV A4C: 26 ML
ECHO LV FRACTIONAL SHORTENING: 40 % (ref 28–44)
ECHO LV INTERNAL DIMENSION DIASTOLIC: 4.5 CM (ref 3.9–5.3)
ECHO LV INTERNAL DIMENSION SYSTOLIC: 2.7 CM
ECHO LV IVSD: 1 CM (ref 0.6–0.9)
ECHO LV MASS 2D: 142.9 G (ref 67–162)
ECHO LV POSTERIOR WALL DIASTOLIC: 0.9 CM (ref 0.6–0.9)
ECHO LV RELATIVE WALL THICKNESS RATIO: 0.4
ECHO LVOT AREA: 2 CM2
ECHO LVOT DIAM: 1.6 CM
ECHO LVOT PEAK GRADIENT: 6 MMHG
ECHO LVOT PEAK VELOCITY: 1.2 M/S
ECHO MV A VELOCITY: 0.61 M/S
ECHO MV AREA PHT: 5.6 CM2
ECHO MV AREA PLAN: 3.8 CM2
ECHO MV E DECELERATION TIME (DT): 101 MS
ECHO MV E VELOCITY: 0.4 M/S
ECHO MV E/A RATIO: 0.66
ECHO MV MAX VELOCITY: 0.8 M/S
ECHO MV MEAN GRADIENT: 1 MMHG
ECHO MV MEAN VELOCITY: 0.4 M/S
ECHO MV PEAK GRADIENT: 2 MMHG
ECHO MV PRESSURE HALF TIME (PHT): 39.4 MS
ECHO MV REGURGITANT PEAK GRADIENT: 38 MMHG
ECHO MV REGURGITANT PEAK VELOCITY: 3.1 M/S
ECHO MV VTI: 19.4 CM
ECHO PV MAX VELOCITY: 0.8 M/S
ECHO PV PEAK GRADIENT: 3 MMHG
ECHO RIGHT VENTRICULAR SYSTOLIC PRESSURE (RVSP): 37 MMHG
ECHO TV REGURGITANT MAX VELOCITY: 2.83 M/S
ECHO TV REGURGITANT PEAK GRADIENT: 33 MMHG

## 2022-12-29 PROCEDURE — 93306 TTE W/DOPPLER COMPLETE: CPT

## 2022-12-29 PROCEDURE — 93306 TTE W/DOPPLER COMPLETE: CPT | Performed by: INTERNAL MEDICINE

## 2023-01-03 ENCOUNTER — APPOINTMENT (OUTPATIENT)
Dept: GENERAL RADIOLOGY | Age: 48
End: 2023-01-03
Attending: SURGERY
Payer: COMMERCIAL

## 2023-01-03 ENCOUNTER — HOSPITAL ENCOUNTER (OUTPATIENT)
Age: 48
Setting detail: OUTPATIENT SURGERY
Discharge: HOME | End: 2023-01-03
Attending: SURGERY | Admitting: SURGERY
Payer: COMMERCIAL

## 2023-01-03 ENCOUNTER — ANESTHESIA (OUTPATIENT)
Dept: MEDSURG UNIT | Age: 48
End: 2023-01-03
Payer: COMMERCIAL

## 2023-01-03 ENCOUNTER — ANESTHESIA EVENT (OUTPATIENT)
Dept: MEDSURG UNIT | Age: 48
End: 2023-01-03
Payer: COMMERCIAL

## 2023-01-03 VITALS
OXYGEN SATURATION: 97 % | HEART RATE: 87 BPM | RESPIRATION RATE: 18 BRPM | DIASTOLIC BLOOD PRESSURE: 84 MMHG | SYSTOLIC BLOOD PRESSURE: 127 MMHG | TEMPERATURE: 98.6 F | BODY MASS INDEX: 34.89 KG/M2 | WEIGHT: 189.6 LBS | HEIGHT: 62 IN

## 2023-01-03 DIAGNOSIS — G89.18 PAIN FOLLOWING SURGERY OR PROCEDURE: ICD-10-CM

## 2023-01-03 DIAGNOSIS — C50.911 MALIGNANT NEOPLASM OF RIGHT FEMALE BREAST, UNSPECIFIED ESTROGEN RECEPTOR STATUS, UNSPECIFIED SITE OF BREAST (HCC): Primary | ICD-10-CM

## 2023-01-03 LAB — HCG UR QL: NEGATIVE

## 2023-01-03 PROCEDURE — 77030031139 HC SUT VCRL2 J&J -A: Performed by: SURGERY

## 2023-01-03 PROCEDURE — 74011250636 HC RX REV CODE- 250/636: Performed by: NURSE ANESTHETIST, CERTIFIED REGISTERED

## 2023-01-03 PROCEDURE — 77030002933 HC SUT MCRYL J&J -A: Performed by: SURGERY

## 2023-01-03 PROCEDURE — 74011000250 HC RX REV CODE- 250: Performed by: SURGERY

## 2023-01-03 PROCEDURE — 76060000061 HC AMB SURG ANES 0.5 TO 1 HR: Performed by: SURGERY

## 2023-01-03 PROCEDURE — 77030002986 HC SUT PROL J&J -A: Performed by: SURGERY

## 2023-01-03 PROCEDURE — 2709999900 HC NON-CHARGEABLE SUPPLY: Performed by: SURGERY

## 2023-01-03 PROCEDURE — 77030002996 HC SUT SLK J&J -A: Performed by: SURGERY

## 2023-01-03 PROCEDURE — 76210000037 HC AMBSU PH I REC 2 TO 2.5 HR: Performed by: SURGERY

## 2023-01-03 PROCEDURE — 77030040361 HC SLV COMPR DVT MDII -B: Performed by: SURGERY

## 2023-01-03 PROCEDURE — 76030000000 HC AMB SURG OR TIME 0.5 TO 1: Performed by: SURGERY

## 2023-01-03 PROCEDURE — 74011250636 HC RX REV CODE- 250/636: Performed by: SURGERY

## 2023-01-03 PROCEDURE — 71045 X-RAY EXAM CHEST 1 VIEW: CPT

## 2023-01-03 PROCEDURE — 81025 URINE PREGNANCY TEST: CPT

## 2023-01-03 PROCEDURE — 77030011267 HC ELECTRD BLD COVD -A: Performed by: SURGERY

## 2023-01-03 PROCEDURE — C1788 PORT, INDWELLING, IMP: HCPCS | Performed by: SURGERY

## 2023-01-03 PROCEDURE — 77030010507 HC ADH SKN DERMBND J&J -B: Performed by: SURGERY

## 2023-01-03 DEVICE — POWERPORT IMPLANTABLE PORT WITH ATTACHABLE 8F CHRONOFLEX OPEN-ENDED SINGLE-LUMEN VENOUS CATHETER INTERMEDIATE KIT (WITH SUTURE PLUGS)
Type: IMPLANTABLE DEVICE | Site: CHEST | Status: FUNCTIONAL
Brand: POWERPORT, CHRONOFLEX

## 2023-01-03 RX ORDER — SODIUM CHLORIDE 0.9 % (FLUSH) 0.9 %
5-40 SYRINGE (ML) INJECTION EVERY 8 HOURS
Status: CANCELLED | OUTPATIENT
Start: 2023-01-03

## 2023-01-03 RX ORDER — CEFAZOLIN SODIUM 1 G/3ML
INJECTION, POWDER, FOR SOLUTION INTRAMUSCULAR; INTRAVENOUS AS NEEDED
Status: DISCONTINUED | OUTPATIENT
Start: 2023-01-03 | End: 2023-01-03 | Stop reason: HOSPADM

## 2023-01-03 RX ORDER — MORPHINE SULFATE 2 MG/ML
2 INJECTION, SOLUTION INTRAMUSCULAR; INTRAVENOUS
Status: CANCELLED | OUTPATIENT
Start: 2023-01-03

## 2023-01-03 RX ORDER — SODIUM CHLORIDE 0.9 % (FLUSH) 0.9 %
5-40 SYRINGE (ML) INJECTION AS NEEDED
Status: DISCONTINUED | OUTPATIENT
Start: 2023-01-03 | End: 2023-01-03 | Stop reason: HOSPADM

## 2023-01-03 RX ORDER — ONDANSETRON 2 MG/ML
INJECTION INTRAMUSCULAR; INTRAVENOUS AS NEEDED
Status: DISCONTINUED | OUTPATIENT
Start: 2023-01-03 | End: 2023-01-03 | Stop reason: HOSPADM

## 2023-01-03 RX ORDER — SODIUM CHLORIDE 0.9 % (FLUSH) 0.9 %
5-40 SYRINGE (ML) INJECTION AS NEEDED
Status: CANCELLED | OUTPATIENT
Start: 2023-01-03

## 2023-01-03 RX ORDER — SODIUM CHLORIDE 0.9 % (FLUSH) 0.9 %
5-40 SYRINGE (ML) INJECTION EVERY 8 HOURS
Status: DISCONTINUED | OUTPATIENT
Start: 2023-01-03 | End: 2023-01-03 | Stop reason: HOSPADM

## 2023-01-03 RX ORDER — HEPARIN 100 UNIT/ML
SYRINGE INTRAVENOUS AS NEEDED
Status: DISCONTINUED | OUTPATIENT
Start: 2023-01-03 | End: 2023-01-03 | Stop reason: HOSPADM

## 2023-01-03 RX ORDER — SODIUM CHLORIDE, SODIUM LACTATE, POTASSIUM CHLORIDE, CALCIUM CHLORIDE 600; 310; 30; 20 MG/100ML; MG/100ML; MG/100ML; MG/100ML
50 INJECTION, SOLUTION INTRAVENOUS CONTINUOUS
Status: DISCONTINUED | OUTPATIENT
Start: 2023-01-03 | End: 2023-01-03 | Stop reason: HOSPADM

## 2023-01-03 RX ORDER — FENTANYL CITRATE 50 UG/ML
INJECTION, SOLUTION INTRAMUSCULAR; INTRAVENOUS AS NEEDED
Status: DISCONTINUED | OUTPATIENT
Start: 2023-01-03 | End: 2023-01-03 | Stop reason: HOSPADM

## 2023-01-03 RX ORDER — HYDROMORPHONE HYDROCHLORIDE 2 MG/1
2 TABLET ORAL
Qty: 15 TABLET | Refills: 0 | Status: SHIPPED | OUTPATIENT
Start: 2023-01-03 | End: 2023-01-06

## 2023-01-03 RX ORDER — ONDANSETRON 2 MG/ML
4 INJECTION INTRAMUSCULAR; INTRAVENOUS AS NEEDED
Status: CANCELLED | OUTPATIENT
Start: 2023-01-03

## 2023-01-03 RX ORDER — SODIUM CHLORIDE, SODIUM LACTATE, POTASSIUM CHLORIDE, CALCIUM CHLORIDE 600; 310; 30; 20 MG/100ML; MG/100ML; MG/100ML; MG/100ML
INJECTION, SOLUTION INTRAVENOUS
Status: DISCONTINUED | OUTPATIENT
Start: 2023-01-03 | End: 2023-01-03 | Stop reason: HOSPADM

## 2023-01-03 RX ORDER — HYDROMORPHONE HYDROCHLORIDE 1 MG/ML
0.2 INJECTION, SOLUTION INTRAMUSCULAR; INTRAVENOUS; SUBCUTANEOUS
Status: CANCELLED | OUTPATIENT
Start: 2023-01-03

## 2023-01-03 RX ORDER — LIDOCAINE HYDROCHLORIDE 10 MG/ML
0.1 INJECTION, SOLUTION EPIDURAL; INFILTRATION; INTRACAUDAL; PERINEURAL AS NEEDED
Status: DISCONTINUED | OUTPATIENT
Start: 2023-01-03 | End: 2023-01-03 | Stop reason: HOSPADM

## 2023-01-03 RX ORDER — PROPOFOL 10 MG/ML
INJECTION, EMULSION INTRAVENOUS AS NEEDED
Status: DISCONTINUED | OUTPATIENT
Start: 2023-01-03 | End: 2023-01-03 | Stop reason: HOSPADM

## 2023-01-03 RX ORDER — MIDAZOLAM HYDROCHLORIDE 1 MG/ML
INJECTION, SOLUTION INTRAMUSCULAR; INTRAVENOUS AS NEEDED
Status: DISCONTINUED | OUTPATIENT
Start: 2023-01-03 | End: 2023-01-03 | Stop reason: HOSPADM

## 2023-01-03 RX ORDER — PROPOFOL 10 MG/ML
INJECTION, EMULSION INTRAVENOUS
Status: DISCONTINUED | OUTPATIENT
Start: 2023-01-03 | End: 2023-01-03 | Stop reason: HOSPADM

## 2023-01-03 RX ORDER — FENTANYL CITRATE 50 UG/ML
25 INJECTION, SOLUTION INTRAMUSCULAR; INTRAVENOUS
Status: CANCELLED | OUTPATIENT
Start: 2023-01-03

## 2023-01-03 RX ORDER — DEXAMETHASONE SODIUM PHOSPHATE 4 MG/ML
INJECTION, SOLUTION INTRA-ARTICULAR; INTRALESIONAL; INTRAMUSCULAR; INTRAVENOUS; SOFT TISSUE AS NEEDED
Status: DISCONTINUED | OUTPATIENT
Start: 2023-01-03 | End: 2023-01-03 | Stop reason: HOSPADM

## 2023-01-03 RX ADMIN — PROPOFOL 25 MG: 10 INJECTION, EMULSION INTRAVENOUS at 07:44

## 2023-01-03 RX ADMIN — PROPOFOL 25 MG: 10 INJECTION, EMULSION INTRAVENOUS at 07:36

## 2023-01-03 RX ADMIN — MIDAZOLAM HYDROCHLORIDE 1 MG: 1 INJECTION, SOLUTION INTRAMUSCULAR; INTRAVENOUS at 07:31

## 2023-01-03 RX ADMIN — PROPOFOL 25 MG: 10 INJECTION, EMULSION INTRAVENOUS at 07:31

## 2023-01-03 RX ADMIN — PROPOFOL 35 MCG/KG/MIN: 10 INJECTION, EMULSION INTRAVENOUS at 07:33

## 2023-01-03 RX ADMIN — MIDAZOLAM HYDROCHLORIDE 1 MG: 1 INJECTION, SOLUTION INTRAMUSCULAR; INTRAVENOUS at 07:37

## 2023-01-03 RX ADMIN — MIDAZOLAM HYDROCHLORIDE 2 MG: 1 INJECTION, SOLUTION INTRAMUSCULAR; INTRAVENOUS at 07:27

## 2023-01-03 RX ADMIN — ONDANSETRON HYDROCHLORIDE 4 MG: 2 INJECTION, SOLUTION INTRAMUSCULAR; INTRAVENOUS at 07:57

## 2023-01-03 RX ADMIN — MIDAZOLAM HYDROCHLORIDE 1 MG: 1 INJECTION, SOLUTION INTRAMUSCULAR; INTRAVENOUS at 07:44

## 2023-01-03 RX ADMIN — FENTANYL CITRATE 25 MCG: 50 INJECTION, SOLUTION INTRAMUSCULAR; INTRAVENOUS at 07:37

## 2023-01-03 RX ADMIN — FENTANYL CITRATE 50 MCG: 50 INJECTION, SOLUTION INTRAMUSCULAR; INTRAVENOUS at 07:27

## 2023-01-03 RX ADMIN — CEFAZOLIN 2 G: 330 INJECTION, POWDER, FOR SOLUTION INTRAMUSCULAR; INTRAVENOUS at 07:37

## 2023-01-03 RX ADMIN — SODIUM CHLORIDE, POTASSIUM CHLORIDE, SODIUM LACTATE AND CALCIUM CHLORIDE: 600; 310; 30; 20 INJECTION, SOLUTION INTRAVENOUS at 07:10

## 2023-01-03 RX ADMIN — FENTANYL CITRATE 25 MCG: 50 INJECTION, SOLUTION INTRAMUSCULAR; INTRAVENOUS at 07:46

## 2023-01-03 RX ADMIN — DEXAMETHASONE SODIUM PHOSPHATE 4 MG: 4 INJECTION, SOLUTION INTRAMUSCULAR; INTRAVENOUS at 07:57

## 2023-01-03 NOTE — ANESTHESIA PREPROCEDURE EVALUATION
Relevant Problems   No relevant active problems       Anesthetic History     PONV          Review of Systems / Medical History  Patient summary reviewed, nursing notes reviewed and pertinent labs reviewed    Pulmonary            Asthma        Neuro/Psych   Within defined limits           Cardiovascular  Within defined limits                     GI/Hepatic/Renal  Within defined limits              Endo/Other    Diabetes  Hypothyroidism  Cancer     Other Findings              Physical Exam    Airway  Mallampati: II  TM Distance: > 6 cm  Neck ROM: normal range of motion   Mouth opening: Normal     Cardiovascular  Regular rate and rhythm,  S1 and S2 normal,  no murmur, click, rub, or gallop             Dental  No notable dental hx       Pulmonary  Breath sounds clear to auscultation               Abdominal  GI exam deferred       Other Findings            Anesthetic Plan    ASA: 2  Anesthesia type: MAC          Induction: Intravenous  Anesthetic plan and risks discussed with: Patient

## 2023-01-03 NOTE — ANESTHESIA POSTPROCEDURE EVALUATION
Post-Anesthesia Evaluation and Assessment    Patient: Daphne Negrete MRN: [id number]  SSN: [id number]    YOB: 1975  Age: 50 y.o. Sex: female      I have evaluated the patient and they are stable and ready for discharge from the PACU. Cardiovascular Function/Vital Signs  Visit Vitals  /80   Pulse 98   Temp 37 °C (98.6 °F)   Resp 18   Ht 5' 2\" (1.575 m)   Wt 86 kg (189 lb 9.5 oz)   SpO2 98%   BMI 34.68 kg/m²       Patient is status post MAC anesthesia for Procedure(s):  PORT-A-CATH CATHETER INSERTION. Nausea/Vomiting: None    Postoperative hydration reviewed and adequate. Pain:      Managed    Neurological Status:   Neuro (WDL): Within Defined Limits (01/03/23 0701)   At baseline    Mental Status, Level of Consciousness: Alert and  oriented to person, place, and time    Pulmonary Status:   O2 Device: Nasal cannula (01/03/23 0825)   Adequate oxygenation and airway patent    Complications related to anesthesia: None    Post-anesthesia assessment completed. No concerns    Signed By: Mamta Cordero MD     January 3, 2023              Post-Anesthesia Evaluation and Assessment    Patient: Daphne Negrete MRN: [id number]  SSN: [id number]    YOB: 1975  Age: 50 y.o. Sex: female      I have evaluated the patient and they are stable and ready for discharge from the PACU. Cardiovascular Function/Vital Signs  Visit Vitals  /80   Pulse 98   Temp 37 °C (98.6 °F)   Resp 18   Ht 5' 2\" (1.575 m)   Wt 86 kg (189 lb 9.5 oz)   SpO2 98%   BMI 34.68 kg/m²       Patient is status post MAC anesthesia for Procedure(s):  PORT-A-CATH CATHETER INSERTION. Nausea/Vomiting: None    Postoperative hydration reviewed and adequate.     Pain:      Managed    Neurological Status:   Neuro (WDL): Within Defined Limits (01/03/23 0701)   At baseline    Mental Status, Level of Consciousness: Alert and  oriented to person, place, and time    Pulmonary Status:   O2 Device: Nasal cannula (01/03/23 0825)   Adequate oxygenation and airway patent    Complications related to anesthesia: None    Post-anesthesia assessment completed. No concerns    Signed By: Emmie Daugherty MD     January 3, 2023              Procedure(s):  PORT-A-CATH CATHETER INSERTION. MAC    <[email or web address]>    INITIAL Post-op Vital signs:   Vitals Value Taken Time   /80 01/03/23 0825   Temp 37 °C (98.6 °F) 01/03/23 0825   Pulse 86 01/03/23 0830   Resp 14 01/03/23 0830   SpO2 92 % 01/03/23 0830   Vitals shown include unvalidated device data.

## 2023-01-03 NOTE — DISCHARGE INSTRUCTIONS
Discharge Instructions from Dr. Neri Barrios    I will call you with the pathology results, typically within 1 week from today. You may shower, but no hot tubs, swimming pools, or baths until your incision is healed. No heavy lifting with the affected extremity (nothing greater than 5 pounds), and limit its use for the next 4-5 days. You may use an ice pack for comfort for the next couple of days, but do not place ice directly on the skin. Rather, use a towel or clothing to serve as a barrier between skin and ice to prevent injury. If I placed a drain, follow the drain instructions provided, especially as you keep a record of the drain output. Follow medication instructions carefully. No aspirin, ibuprofen or aleve. May take tylenol stead narcotic  Wear surgical bra for 24 hours, then remove. Wear supportive bra at all times. You will have bruising and swelling  Watch for signs of infection as listed below. Redness  Swelling  Drainage from the incision or from your nipple that appears infected  Fever over 101.5 degrees for consecutive readings, or over 99.5 if you are currently undergoing chemotherapy. Call our office (number is below) for a follow-up appointment. If you have any problems, our phone number is [phone]       POST ANESTHESIA INSTRUCTIONS  DISCHARGE SUMMARY from Nurse    PATIENT INSTRUCTIONS:    After general anesthesia or intravenous sedation, for 24 hours or while taking prescription Narcotics:  Limit your activities  Do not drive and operate hazardous machinery  Do not make important personal or business decisions  Do  not drink alcoholic beverages  If you have not urinated within 8 hours after discharge, please contact your surgeon on call.     Report the following to your surgeon:  Excessive pain, swelling, redness or odor of or around the surgical area  Temperature over 100.5  Nausea and vomiting lasting longer than 4 hours or if unable to take medications  Any signs of decreased circulation or nerve impairment to extremity: change in color, persistent  numbness, tingling, coldness or increase pain  Any questions    What to do at Home:  Recommended activity: See surgical instructions,     If you have any concerns, please follow up with Dr Alanis Cruz. *  Please give a list of your current medications to your Primary Care Provider. *  Please update this list whenever your medications are discontinued, doses are      changed, or new medications (including over-the-counter products) are added. *  Please carry medication information at all times in case of emergency situations. These are general instructions for a healthy lifestyle:    No smoking/ No tobacco products/ Avoid exposure to second hand smoke  Surgeon General's Warning:  Quitting smoking now greatly reduces serious risk to your health. Obesity, smoking, and sedentary lifestyle greatly increases your risk for illness    A healthy diet, regular physical exercise & weight monitoring are important for maintaining a healthy lifestyle    You may be retaining fluid if you have a history of heart failure or if you experience any of the following symptoms:  Weight gain of 3 pounds or more overnight or 5 pounds in a week, increased swelling in our hands or feet or shortness of breath while lying flat in bed. Please call your doctor as soon as you notice any of these symptoms; do not wait until your next office visit. The discharge information has been reviewed with the patient and spouse. The patient and spouse verbalized understanding. Discharge medications reviewed with the patient and spouse and appropriate educational materials and side effects teaching were provided.   ___________________________________________________________________________________________________________________________________

## 2023-01-03 NOTE — BRIEF OP NOTE
Brief Postoperative Note    Patient: Sola Baer  YOB: 1975  MRN: [id number]    Date of Procedure: 1/3/2023     Pre-Op Diagnosis: S/P RIGHT BREAST LUMPECTOMY    Post-Op Diagnosis: Same as preoperative diagnosis. Procedure(s):  PORT-A-CATH CATHETER INSERTION    Surgeon(s): Varun Manzanares MD    Surgical Assistant: Surg Asst-1: Rawleigh Lesser    Anesthesia: MAC     Estimated Blood Loss (mL): Minimal    Complications: None    Specimens: * No specimens in log *     Implants:   Implant Name Type Inv.  Item Serial No.  Lot No. LRB No. Used Action   PORT ATTACH CATH POWERPORT 8FR --  - SN/A  PORT ATTACH CATH POWERPORT 8FR --  N/A [employer] [id number] Left 1 Implanted       Drains: * No LDAs found *    Findings: port placed left subclavian    Electronically Signed by Davide Garcia MD on 1/3/2023 at 8:12 AM    Dictated stat

## 2023-01-03 NOTE — INTERVAL H&P NOTE
Update History & Physical    The Patient's History and Physical of December 12, 2022  Port placment  was reviewed with the patient and I examined the patient. There was no change. The surgical site was confirmed by the patient and me. Plan:  The risk, benefits, expected outcome, and alternative to the recommended procedure have been discussed with the patient. Patient understands and wants to proceed with the procedure.     Electronically signed by Taz Cao MD on 1/3/2023 at 7:22 AM

## 2023-01-03 NOTE — PERIOP NOTES
Portable chest xray completed post jose raul cath insertion, waiting results. NPO until results received. Patient resting.

## 2023-01-03 NOTE — OP NOTES
[address]  OPERATIVE REPORT    Name:  Jewel Gonzales  MR#:  [id number]  :  1975  ACCOUNT #:  [de-identified]  DATE OF SERVICE:  2023    PREOPERATIVE DIAGNOSIS:  Right breast cancer, need intravenous access for adjuvant chemo. POSTOPERATIVE DIAGNOSIS:  Right breast cancer, need intravenous access for adjuvant chemo. PROCEDURE PERFORMED:  Left subclavian port insertion. SURGEON:  Makenzie Mccall MD    ASSISTANT:  Jeff Arias. ANESTHESIA:  MAC.    COMPLICATIONS:  None. SPECIMENS REMOVED:  None. IMPLANTS:  An 8-[employer] PowerPort. DRAINS:  No drains. ESTIMATED BLOOD LOSS:  Minimal.    FINDINGS:  Port placed in left subclavian. INDICATIONS:  This is a 49-year-old female with a right breast cancer. She needs a port placed for adjuvant chemo. PROCEDURE:  The patient was seen in the preop holding area where surgical site was marked by surgeon. Informed consent was obtained. She was taken to the operating room and laid in supine position where MAC anesthesia was induced. Bilateral chest and neck prepped and draped in the usual fashion. A time-out was performed. The patient was placed in Trendelenburg position. Attention was turned to the left chest wall. Just superior to the clavicle, 10 mL of local anesthetic was injected. An 18-gauge introducer needle was used to access the left subclavian vein via landmarks. This went very easily. The syringe was removed. The wire was threaded through the needle, and the needle was removed and the wire was going toward the SVC and right atrium on fluoroscopy. Another 10 mL of local anesthetic was injected around the wire. A 15-blade was used to make an incision at the wire. Bovie cautery was used to create a port pocket. The dilator sheath was threaded over the wire. The inner cannula wire was removed. The catheter was clamped and threaded through the sheath and the sheath was torn away.   The tip of the catheter was adjusted to be the tip at the junction of SVC and right atrium on fluoro. Next, the catheter was cut, threaded on the port. The port was secured in the port pocket on either side with 3-0 Prolene. The port aspirated and flushed well with injectable saline and final heparin solution. Another 10 mL of local anesthetic was injected at the port site. The deeper tissues were closed with interrupted 3-0 Vicryl and the skin with 4-0 subcuticular Monocryl. Skin glue was placed on the incision. All sponge, needle, and instrument counts were correct. The patient went to Recovery in stable condition.       MD KELLY [id number]  D:  01/03/2023 8:18  T:  01/03/2023 8:39  JOB #:  [id number]

## 2023-01-17 ENCOUNTER — OFFICE VISIT (OUTPATIENT)
Dept: SURGERY | Age: 48
End: 2023-01-17
Payer: COMMERCIAL

## 2023-01-17 VITALS — HEIGHT: 62 IN | WEIGHT: 189 LBS | BODY MASS INDEX: 34.78 KG/M2

## 2023-01-17 DIAGNOSIS — Z98.890 S/P LUMPECTOMY OF BREAST: ICD-10-CM

## 2023-01-17 DIAGNOSIS — C50.311 MALIGNANT NEOPLASM OF LOWER-INNER QUADRANT OF RIGHT BREAST OF FEMALE, ESTROGEN RECEPTOR POSITIVE (HCC): Primary | ICD-10-CM

## 2023-01-17 DIAGNOSIS — Z17.0 MALIGNANT NEOPLASM OF LOWER-INNER QUADRANT OF RIGHT BREAST OF FEMALE, ESTROGEN RECEPTOR POSITIVE (HCC): Primary | ICD-10-CM

## 2023-01-17 PROCEDURE — 99024 POSTOP FOLLOW-UP VISIT: CPT | Performed by: NURSE PRACTITIONER

## 2023-01-17 NOTE — PROGRESS NOTES
HISTORY OF PRESENT ILLNESS  Kong Phillips is a 50 y.o. female. HPI Established patient presents for a post-op visit. Breast history -   Referring - Dr. Lizette Witt - [address]  10/14/22- US guided RIGHT breast biopsy ([address]) - RIGHT breast IDC 3:00 grd 2, 6mm, ER 80%, NY 50%, KI-67 <5%, HER2 FISH pos  Mammaprint - high risk  11/29/22 RIGHT breast lumpectomy  and SLNB - Dr. Bhanu Celestin  1.4cm IDC; ER pos, NY pos, HER2 pos; node negative 0/2 - T1cN0  1/3/23 - port placed - Dr. Eveline Roberts - Dr. Pavan Lynch  XRT after chemo - Dr. Dodie Rodarte      Family history -   Mother - breast cancer  Maternal grandmother - ocular cancer  Great uncle - lung cancer  Genetic testing at [address] - negative per patient     ROS    Physical Exam  Chest:           ASSESSMENT and PLAN    ICD-10-CM ICD-9-CM    1. Malignant neoplasm of lower-inner quadrant of right breast of female, estrogen receptor positive ([facility] [address]  C50.311 174.3     Z17.0 V86.0       2. S/P lumpectomy of breast  Z98.890 V45.89           Port site - incision CDI and healing well. Mild resolving ecchymosis inferior to incision. Port has been used x 1 without issue. Had consult with Dr. Dodie Rodarte and will follow-up with him post chemo. Plans for XRT to start around June, about a month after completing chemo. Has had infusion x 1 with Dr. Pavan Lynch. Should finish 12 taxol treatments in 4/2023. RTC in 8/2023 or sooner PRN. She is comfortable with this plan. All questions answered and she stated understanding.

## 2023-03-03 ENCOUNTER — TRANSCRIBE ORDER (OUTPATIENT)
Dept: SCHEDULING | Age: 48
End: 2023-03-03

## 2023-03-03 DIAGNOSIS — C50.311 MALIGNANT NEOPLASM OF LOWER-INNER QUADRANT OF RIGHT FEMALE BREAST (HCC): Primary | ICD-10-CM

## 2023-03-03 DIAGNOSIS — Z51.12 ENCOUNTER FOR ANTINEOPLASTIC IMMUNOTHERAPY: ICD-10-CM

## 2023-03-03 DIAGNOSIS — Z51.11 ENCOUNTER FOR ANTINEOPLASTIC CHEMOTHERAPY: ICD-10-CM

## 2023-03-03 DIAGNOSIS — Z01.810 ENCOUNTER FOR PREPROCEDURAL CARDIOVASCULAR EXAMINATION: ICD-10-CM

## 2023-03-27 ENCOUNTER — HOSPITAL ENCOUNTER (OUTPATIENT)
Dept: NON INVASIVE DIAGNOSTICS | Age: 48
Discharge: HOME | End: 2023-03-27
Attending: INTERNAL MEDICINE
Payer: COMMERCIAL

## 2023-03-27 VITALS
SYSTOLIC BLOOD PRESSURE: 138 MMHG | HEIGHT: 62 IN | WEIGHT: 189 LBS | DIASTOLIC BLOOD PRESSURE: 80 MMHG | BODY MASS INDEX: 34.78 KG/M2

## 2023-03-27 DIAGNOSIS — C50.311 MALIGNANT NEOPLASM OF LOWER-INNER QUADRANT OF RIGHT FEMALE BREAST (HCC): ICD-10-CM

## 2023-03-27 DIAGNOSIS — Z51.11 ENCOUNTER FOR ANTINEOPLASTIC CHEMOTHERAPY: ICD-10-CM

## 2023-03-27 DIAGNOSIS — Z51.12 ENCOUNTER FOR ANTINEOPLASTIC IMMUNOTHERAPY: ICD-10-CM

## 2023-03-27 DIAGNOSIS — Z01.810 ENCOUNTER FOR PREPROCEDURAL CARDIOVASCULAR EXAMINATION: ICD-10-CM

## 2023-03-27 LAB
ECHO AO ROOT DIAM: 2.4 CM
ECHO AO ROOT INDEX: 1.28 CM/M2
ECHO AR MAX VEL PISA: 4.7 M/S
ECHO AV AREA PEAK VELOCITY: 2.7 CM2
ECHO AV AREA/BSA PEAK VELOCITY: 1.4 CM2/M2
ECHO AV PEAK GRADIENT: 8 MMHG
ECHO AV PEAK VELOCITY: 1.4 M/S
ECHO AV REGURGITANT PHT: 548.4 MILLISECOND
ECHO AV VELOCITY RATIO: 0.93
ECHO LA DIAMETER INDEX: 1.87 CM/M2
ECHO LA DIAMETER: 3.5 CM
ECHO LA TO AORTIC ROOT RATIO: 1.46
ECHO LV FRACTIONAL SHORTENING: 31 % (ref 28–44)
ECHO LV INTERNAL DIMENSION DIASTOLE INDEX: 2.25 CM/M2
ECHO LV INTERNAL DIMENSION DIASTOLIC: 4.2 CM (ref 3.9–5.3)
ECHO LV INTERNAL DIMENSION SYSTOLIC INDEX: 1.55 CM/M2
ECHO LV INTERNAL DIMENSION SYSTOLIC: 2.9 CM
ECHO LV IVSD: 1 CM (ref 0.6–0.9)
ECHO LV MASS 2D: 147 G (ref 67–162)
ECHO LV MASS INDEX 2D: 78.6 G/M2 (ref 43–95)
ECHO LV POSTERIOR WALL DIASTOLIC: 1.1 CM (ref 0.6–0.9)
ECHO LV RELATIVE WALL THICKNESS RATIO: 0.52
ECHO LVOT AREA: 3.1 CM2
ECHO LVOT DIAM: 2 CM
ECHO LVOT PEAK GRADIENT: 6 MMHG
ECHO LVOT PEAK VELOCITY: 1.3 M/S
ECHO MV A VELOCITY: 0.62 M/S
ECHO MV E DECELERATION TIME (DT): 113.5 MS
ECHO MV E VELOCITY: 0.73 M/S
ECHO MV E/A RATIO: 1.18
ECHO PV MAX VELOCITY: 0.8 M/S
ECHO PV PEAK GRADIENT: 2 MMHG

## 2023-03-27 PROCEDURE — 93306 TTE W/DOPPLER COMPLETE: CPT

## 2023-03-27 PROCEDURE — 93306 TTE W/DOPPLER COMPLETE: CPT | Performed by: SPECIALIST

## 2023-06-26 ENCOUNTER — HOSPITAL ENCOUNTER (OUTPATIENT)
Facility: HOSPITAL | Age: 48
Discharge: HOME | End: 2023-06-28
Attending: INTERNAL MEDICINE
Payer: COMMERCIAL

## 2023-06-26 DIAGNOSIS — Z01.810 ENCOUNTER FOR PRE-OPERATIVE CARDIOVASCULAR CLEARANCE: ICD-10-CM

## 2023-06-26 DIAGNOSIS — Z51.11 ENCOUNTER FOR ANTINEOPLASTIC CHEMOTHERAPY: ICD-10-CM

## 2023-06-26 DIAGNOSIS — Z51.12 ENCOUNTER FOR ANTINEOPLASTIC IMMUNOTHERAPY: ICD-10-CM

## 2023-06-26 DIAGNOSIS — C50.311 MALIGNANT NEOPLASM OF LOWER-INNER QUADRANT OF RIGHT FEMALE BREAST, UNSPECIFIED ESTROGEN RECEPTOR STATUS (HCC): ICD-10-CM

## 2023-06-26 DIAGNOSIS — R19.7 DIARRHEA, UNSPECIFIED TYPE: ICD-10-CM

## 2023-06-26 LAB
ECHO AO ROOT DIAM: 2 CM
ECHO AR MAX VEL PISA: 4.8 M/S
ECHO AV AREA PLAN: 3.4 CM2
ECHO AV REGURGITANT PHT: 503.6 MILLISECOND
ECHO EST RA PRESSURE: 5 MMHG
ECHO LA DIAMETER: 3.6 CM
ECHO LA TO AORTIC ROOT RATIO: 1.8
ECHO LA VOL 4C: 50 ML (ref 22–52)
ECHO LV EDV A4C: 89 ML
ECHO LV EJECTION FRACTION A4C: 70 %
ECHO LV ESV A4C: 27 ML
ECHO LV FRACTIONAL SHORTENING: 33 % (ref 28–44)
ECHO LV INTERNAL DIMENSION DIASTOLIC: 4.2 CM (ref 3.9–5.3)
ECHO LV INTERNAL DIMENSION SYSTOLIC: 2.8 CM
ECHO LV IVSD: 1 CM (ref 0.6–0.9)
ECHO LV MASS 2D: 137.2 G (ref 67–162)
ECHO LV POSTERIOR WALL DIASTOLIC: 1 CM (ref 0.6–0.9)
ECHO LV RELATIVE WALL THICKNESS RATIO: 0.48
ECHO LVOT AREA: 2.3 CM2
ECHO LVOT DIAM: 1.7 CM
ECHO LVOT PEAK GRADIENT: 6 MMHG
ECHO LVOT PEAK VELOCITY: 1.2 M/S
ECHO MV A VELOCITY: 0.36 M/S
ECHO MV AREA PHT: 4.3 CM2
ECHO MV E DECELERATION TIME (DT): 100.3 MS
ECHO MV E VELOCITY: 0.34 M/S
ECHO MV E/A RATIO: 0.94
ECHO MV MAX VELOCITY: 0.9 M/S
ECHO MV MEAN GRADIENT: 1 MMHG
ECHO MV MEAN VELOCITY: 0.5 M/S
ECHO MV PEAK GRADIENT: 3 MMHG
ECHO MV PRESSURE HALF TIME (PHT): 50.7 MS
ECHO MV REGURGITANT PEAK GRADIENT: 112 MMHG
ECHO MV REGURGITANT PEAK VELOCITY: 5.3 M/S
ECHO MV VTI: 23.1 CM
ECHO PULMONARY ARTERY END DIASTOLIC PRESSURE: 8 MMHG
ECHO PV MAX VELOCITY: 0.6 M/S
ECHO PV PEAK GRADIENT: 2 MMHG
ECHO RIGHT VENTRICULAR SYSTOLIC PRESSURE (RVSP): 26 MMHG
ECHO TV REGURGITANT MAX VELOCITY: 2.27 M/S
ECHO TV REGURGITANT PEAK GRADIENT: 21 MMHG

## 2023-06-26 PROCEDURE — 93306 TTE W/DOPPLER COMPLETE: CPT

## 2023-08-17 ENCOUNTER — OFFICE VISIT (OUTPATIENT)
Age: 48
End: 2023-08-17
Payer: COMMERCIAL

## 2023-08-17 VITALS — BODY MASS INDEX: 33.13 KG/M2 | WEIGHT: 180 LBS | HEIGHT: 62 IN

## 2023-08-17 DIAGNOSIS — Z17.0 MALIGNANT NEOPLASM OF LOWER-INNER QUADRANT OF RIGHT BREAST OF FEMALE, ESTROGEN RECEPTOR POSITIVE (HCC): Primary | ICD-10-CM

## 2023-08-17 DIAGNOSIS — Z92.3 S/P RADIATION THERAPY: ICD-10-CM

## 2023-08-17 DIAGNOSIS — Z98.890 S/P LUMPECTOMY OF BREAST: ICD-10-CM

## 2023-08-17 DIAGNOSIS — C50.311 MALIGNANT NEOPLASM OF LOWER-INNER QUADRANT OF RIGHT BREAST OF FEMALE, ESTROGEN RECEPTOR POSITIVE (HCC): Primary | ICD-10-CM

## 2023-08-17 DIAGNOSIS — Z85.3 HISTORY OF BREAST CANCER IN FEMALE: ICD-10-CM

## 2023-08-17 PROCEDURE — 99213 OFFICE O/P EST LOW 20 MIN: CPT | Performed by: NURSE PRACTITIONER

## 2023-08-17 RX ORDER — VENLAFAXINE HYDROCHLORIDE 37.5 MG/1
CAPSULE, EXTENDED RELEASE ORAL
COMMUNITY
Start: 2023-07-26

## 2023-08-17 RX ORDER — TRAZODONE HYDROCHLORIDE 50 MG/1
TABLET ORAL
COMMUNITY
Start: 2023-08-09

## 2023-08-17 NOTE — PROGRESS NOTES
HISTORY OF PRESENT ILLNESS  Sparkle Lewis is a 46 y.o. female     HPI  Established patient presents for follow-up for RIGHT breast cancer. Denies breast mass, skin changes, nipple discharge and pain. Breast history -   Referring - Dr. Alyssia Wiggins - [place] [address]  10/14/22- US guided RIGHT breast biopsy ([place] [address]) - RIGHT breast IDC 3:00 grd 2, 6mm, ER 80%, ME 50%, KI-67 <5%, HER2 FISH pos  Mammaprint - high risk  22 RIGHT breast lumpectomy  and SLNB - Dr. Park Denton  1.4cm IDC; ER pos, ME pos, HER2 pos; node negative 0/2 - T1cN0  1/3/23 - port placed - Dr. Park Denton  2023 completed adjuvant chemotherapy - Dr. Patrica King  23 - completed XRT - Dr. Mik Agee  2023 - started tamoxifen  Continue on immunotherapy - Dr. Patrica King             Family history -    Mother - breast cancer   Maternal grandmother - ocular cancer   Great uncle - lung cancer   Genetic testing at [place] [address] - negative per patient         OB History          1    Para        Term   1            AB        Living   1         SAB        IAB        Ectopic        Molar        Multiple        Live Births                        Past Surgical History:   Procedure Laterality Date    BREAST LUMPECTOMY Right 2022    RIGHT BREAST ULTRASOUND GUIDED LUMPECTOMY/RIGHT BREAST SENTINEL NODE BIOPSY performed by Heidy Acosta MD at [facility]      GYN          OTHER SURGICAL HISTORY      cat scratch fever, LYMPH NODE I&D             Review of Systems      Physical Exam  Constitutional:       Appearance: Normal appearance. Chest:   Breasts:     Right: Tenderness present. No mass, nipple discharge or skin change. Left: No mass, nipple discharge, skin change or tenderness. Comments: RIGHT breast - tender with expected post treatment changes  Musculoskeletal:      Comments: FROM - UE x 2   Lymphadenopathy:      Upper Body:      Right upper body: No supraclavicular or axillary adenopathy.       Left upper

## 2023-09-07 ENCOUNTER — TRANSCRIBE ORDERS (OUTPATIENT)
Facility: HOSPITAL | Age: 48
End: 2023-09-07

## 2023-09-07 DIAGNOSIS — C50.311 MALIGNANT NEOPLASM OF LOWER-INNER QUADRANT OF RIGHT FEMALE BREAST, UNSPECIFIED ESTROGEN RECEPTOR STATUS (HCC): ICD-10-CM

## 2023-09-07 DIAGNOSIS — Z01.810 ENCOUNTER FOR PREPROCEDURAL CARDIOVASCULAR EXAMINATION: ICD-10-CM

## 2023-09-07 DIAGNOSIS — Z51.11 ENCOUNTER FOR ANTINEOPLASTIC CHEMOTHERAPY: ICD-10-CM

## 2023-09-07 DIAGNOSIS — R19.7 DIARRHEA, UNSPECIFIED TYPE: Primary | ICD-10-CM

## 2023-09-07 DIAGNOSIS — Z51.12 ENCOUNTER FOR ANTINEOPLASTIC IMMUNOTHERAPY: ICD-10-CM

## 2023-09-21 ENCOUNTER — HOSPITAL ENCOUNTER (OUTPATIENT)
Facility: HOSPITAL | Age: 48
Discharge: HOME | End: 2023-09-23
Attending: INTERNAL MEDICINE
Payer: COMMERCIAL

## 2023-09-21 DIAGNOSIS — R19.7 DIARRHEA, UNSPECIFIED TYPE: ICD-10-CM

## 2023-09-21 DIAGNOSIS — C50.311 MALIGNANT NEOPLASM OF LOWER-INNER QUADRANT OF RIGHT FEMALE BREAST, UNSPECIFIED ESTROGEN RECEPTOR STATUS (HCC): ICD-10-CM

## 2023-09-21 DIAGNOSIS — Z01.810 ENCOUNTER FOR PREPROCEDURAL CARDIOVASCULAR EXAMINATION: ICD-10-CM

## 2023-09-21 DIAGNOSIS — Z51.11 ENCOUNTER FOR ANTINEOPLASTIC CHEMOTHERAPY: ICD-10-CM

## 2023-09-21 DIAGNOSIS — Z51.12 ENCOUNTER FOR ANTINEOPLASTIC IMMUNOTHERAPY: ICD-10-CM

## 2023-09-21 PROCEDURE — 93306 TTE W/DOPPLER COMPLETE: CPT

## 2023-09-22 LAB
ECHO AO ROOT DIAM: 2.4 CM
ECHO AR MAX VEL PISA: 3.2 M/S
ECHO AV AREA PEAK VELOCITY: 2.3 CM2
ECHO AV AREA PLAN: 2.2 CM2
ECHO AV MEAN GRADIENT: 4 MMHG
ECHO AV MEAN VELOCITY: 1 M/S
ECHO AV PEAK GRADIENT: 9 MMHG
ECHO AV PEAK VELOCITY: 1.5 M/S
ECHO AV REGURGITANT PHT: 1175.6 MILLISECOND
ECHO AV VELOCITY RATIO: 1
ECHO AV VTI: 25.1 CM
ECHO LA DIAMETER: 3.5 CM
ECHO LA TO AORTIC ROOT RATIO: 1.46
ECHO LA VOL 4C: 35 ML (ref 22–52)
ECHO LV EDV A4C: 86 ML
ECHO LV EJECTION FRACTION A4C: 55 %
ECHO LV ESV A4C: 38 ML
ECHO LV FRACTIONAL SHORTENING: 30 % (ref 28–44)
ECHO LV INTERNAL DIMENSION DIASTOLIC: 4.4 CM (ref 3.9–5.3)
ECHO LV INTERNAL DIMENSION SYSTOLIC: 3.1 CM
ECHO LV IVSD: 1 CM (ref 0.6–0.9)
ECHO LV MASS 2D: 180 G (ref 67–162)
ECHO LV POSTERIOR WALL DIASTOLIC: 1.3 CM (ref 0.6–0.9)
ECHO LV RELATIVE WALL THICKNESS RATIO: 0.59
ECHO LVOT AREA: 2.5 CM2
ECHO LVOT DIAM: 1.8 CM
ECHO LVOT PEAK GRADIENT: 8 MMHG
ECHO LVOT PEAK VELOCITY: 1.5 M/S
ECHO MV A VELOCITY: 0.55 M/S
ECHO MV AREA PHT: 4.6 CM2
ECHO MV E DECELERATION TIME (DT): 141.5 MS
ECHO MV E VELOCITY: 0.41 M/S
ECHO MV E/A RATIO: 0.75
ECHO MV MAX VELOCITY: 0.7 M/S
ECHO MV MEAN GRADIENT: 1 MMHG
ECHO MV MEAN VELOCITY: 0.4 M/S
ECHO MV PEAK GRADIENT: 2 MMHG
ECHO MV PRESSURE HALF TIME (PHT): 47.9 MS
ECHO MV VTI: 16.8 CM
ECHO PV MAX VELOCITY: 0.7 M/S
ECHO PV PEAK GRADIENT: 2 MMHG

## 2023-10-31 ENCOUNTER — OFFICE VISIT (OUTPATIENT)
Age: 48
End: 2023-10-31
Payer: COMMERCIAL

## 2023-10-31 VITALS — BODY MASS INDEX: 33.13 KG/M2 | HEIGHT: 62 IN | WEIGHT: 180 LBS

## 2023-10-31 DIAGNOSIS — Z98.890 S/P LUMPECTOMY OF BREAST: ICD-10-CM

## 2023-10-31 DIAGNOSIS — C50.311 MALIGNANT NEOPLASM OF LOWER-INNER QUADRANT OF RIGHT BREAST OF FEMALE, ESTROGEN RECEPTOR POSITIVE (HCC): Primary | ICD-10-CM

## 2023-10-31 DIAGNOSIS — Z92.3 S/P RADIATION THERAPY: ICD-10-CM

## 2023-10-31 DIAGNOSIS — Z85.3 HISTORY OF BREAST CANCER IN FEMALE: ICD-10-CM

## 2023-10-31 DIAGNOSIS — Z17.0 MALIGNANT NEOPLASM OF LOWER-INNER QUADRANT OF RIGHT BREAST OF FEMALE, ESTROGEN RECEPTOR POSITIVE (HCC): Primary | ICD-10-CM

## 2023-10-31 PROCEDURE — 99213 OFFICE O/P EST LOW 20 MIN: CPT | Performed by: NURSE PRACTITIONER

## 2023-10-31 RX ORDER — TAMOXIFEN CITRATE 20 MG/1
TABLET ORAL
COMMUNITY
Start: 2023-05-19

## 2023-10-31 ASSESSMENT — ENCOUNTER SYMPTOMS: BREAST PAIN: 1

## 2023-10-31 NOTE — PROGRESS NOTES
HISTORY OF PRESENT ILLNESS  Antoine Baltazar is a 46 y.o. female     Breast Pain  Associated Symptoms: breast pain      Established patient presents for follow-up for RIGHT breast cancer. Denies breast mass, skin changes, nipple discharge and pain. Reports recent onset of RIGHT breast discomfort when exercising. Also reports some discomfort when she removes a sports bra. Also reports some tenderness on palpation.              Breast history -   [place]. [place] [address]  10/14/22- US guided RIGHT breast biopsy ([place] [address]) - RIGHT breast IDC 3:00 grd 2, 6mm, ER 80%, AK 50%, KI-67 <5%, HER2 FISH pos  Mammaprint - high risk  22 RIGHT breast lumpectomy  and SLNB - Dr. Mata Kitchen  1.4cm IDC; ER pos, AK pos, HER2 pos; node negative 0/2 - T1cN0  1/3/23 - port placed - Dr. Mata Kitchen  2023 completed adjuvant chemotherapy - Dr. Ev Gonzalez  23 - completed XRT - Dr. Yuvonne Sever  2023 - started tamoxifen  Continue on immunotherapy - Dr. Ev Gonzalez               Family history -    Mother - breast cancer   Maternal grandmother - ocular cancer   Great uncle - lung cancer   Genetic testing at [place] [address] - negative per patient         OB History          1    Para        Term   1            AB        Living   1         SAB        IAB        Ectopic        Molar        Multiple        Live Births              Obstetric Comments   Menarche 13, LMP , # of children 1, age of 4st delivery 38, Hysterectomy/oophorectomy no/no, Breast bx yes, history of breast feeding yes, BCP yes, Hormone therapy no                  Past Surgical History:   Procedure Laterality Date    BREAST LUMPECTOMY Right 2022    RIGHT BREAST ULTRASOUND GUIDED LUMPECTOMY/RIGHT BREAST SENTINEL NODE BIOPSY performed by Nicolas Trevino MD at [address]      GYN          OTHER SURGICAL HISTORY      cat scratch fever, LYMPH NODE I&D           Review of Systems      Physical Exam  Constitutional:       Appearance:

## 2023-11-07 SDOH — HEALTH STABILITY: PHYSICAL HEALTH: ON AVERAGE, HOW MANY MINUTES DO YOU ENGAGE IN EXERCISE AT THIS LEVEL?: 20 MIN

## 2023-11-07 SDOH — HEALTH STABILITY: PHYSICAL HEALTH: ON AVERAGE, HOW MANY DAYS PER WEEK DO YOU ENGAGE IN MODERATE TO STRENUOUS EXERCISE (LIKE A BRISK WALK)?: 4 DAYS

## 2023-11-07 ASSESSMENT — SOCIAL DETERMINANTS OF HEALTH (SDOH)
WITHIN THE LAST YEAR, HAVE TO BEEN RAPED OR FORCED TO HAVE ANY KIND OF SEXUAL ACTIVITY BY YOUR PARTNER OR EX-PARTNER?: NO
WITHIN THE LAST YEAR, HAVE YOU BEEN HUMILIATED OR EMOTIONALLY ABUSED IN OTHER WAYS BY YOUR PARTNER OR EX-PARTNER?: NO
WITHIN THE LAST YEAR, HAVE YOU BEEN KICKED, HIT, SLAPPED, OR OTHERWISE PHYSICALLY HURT BY YOUR PARTNER OR EX-PARTNER?: NO
WITHIN THE LAST YEAR, HAVE YOU BEEN AFRAID OF YOUR PARTNER OR EX-PARTNER?: NO

## 2023-11-09 ENCOUNTER — OFFICE VISIT (OUTPATIENT)
Dept: PRIMARY CARE CLINIC | Facility: CLINIC | Age: 48
End: 2023-11-09
Payer: COMMERCIAL

## 2023-11-09 VITALS
DIASTOLIC BLOOD PRESSURE: 84 MMHG | BODY MASS INDEX: 33.31 KG/M2 | RESPIRATION RATE: 17 BRPM | SYSTOLIC BLOOD PRESSURE: 129 MMHG | HEART RATE: 75 BPM | OXYGEN SATURATION: 96 % | HEIGHT: 62 IN | WEIGHT: 181 LBS

## 2023-11-09 DIAGNOSIS — F32.A ANXIETY AND DEPRESSION: ICD-10-CM

## 2023-11-09 DIAGNOSIS — G47.00 INSOMNIA, UNSPECIFIED TYPE: ICD-10-CM

## 2023-11-09 DIAGNOSIS — F32.A ANXIETY AND DEPRESSION: Primary | ICD-10-CM

## 2023-11-09 DIAGNOSIS — F41.9 ANXIETY AND DEPRESSION: ICD-10-CM

## 2023-11-09 DIAGNOSIS — E53.8 B12 DEFICIENCY: ICD-10-CM

## 2023-11-09 DIAGNOSIS — E78.1 HIGH TRIGLYCERIDES: ICD-10-CM

## 2023-11-09 DIAGNOSIS — Z11.59 NEED FOR HEPATITIS C SCREENING TEST: ICD-10-CM

## 2023-11-09 DIAGNOSIS — C80.1 DUCTAL CARCINOMA (HCC): ICD-10-CM

## 2023-11-09 DIAGNOSIS — R73.03 PREDIABETES: ICD-10-CM

## 2023-11-09 DIAGNOSIS — F41.9 ANXIETY AND DEPRESSION: Primary | ICD-10-CM

## 2023-11-09 LAB
ALBUMIN SERPL-MCNC: 3.5 G/DL (ref 3.5–5)
ALBUMIN/GLOB SERPL: 1 (ref 1.1–2.2)
ALP SERPL-CCNC: 51 U/L (ref 45–117)
ALT SERPL-CCNC: 46 U/L (ref 12–78)
ANION GAP SERPL CALC-SCNC: 6 MMOL/L (ref 5–15)
AST SERPL-CCNC: 40 U/L (ref 15–37)
BILIRUB SERPL-MCNC: 0.5 MG/DL (ref 0.2–1)
BUN SERPL-MCNC: 16 MG/DL (ref 6–20)
BUN/CREAT SERPL: 26 (ref 12–20)
CALCIUM SERPL-MCNC: 8.9 MG/DL (ref 8.5–10.1)
CHLORIDE SERPL-SCNC: 106 MMOL/L (ref 97–108)
CHOLEST SERPL-MCNC: 133 MG/DL
CO2 SERPL-SCNC: 28 MMOL/L (ref 21–32)
CREAT SERPL-MCNC: 0.62 MG/DL (ref 0.55–1.02)
ERYTHROCYTE [DISTWIDTH] IN BLOOD BY AUTOMATED COUNT: 14.2 % (ref 11.5–14.5)
EST. AVERAGE GLUCOSE BLD GHB EST-MCNC: 117 MG/DL
GLOBULIN SER CALC-MCNC: 3.5 G/DL (ref 2–4)
GLUCOSE SERPL-MCNC: 99 MG/DL (ref 65–100)
HBA1C MFR BLD: 5.7 % (ref 4–5.6)
HCT VFR BLD AUTO: 39.8 % (ref 35–47)
HCV AB SER IA-ACNC: 0.07 INDEX
HCV AB SERPL QL IA: NONREACTIVE
HDLC SERPL-MCNC: 60 MG/DL
HDLC SERPL: 2.2 (ref 0–5)
HGB BLD-MCNC: 12.7 G/DL (ref 11.5–16)
LDLC SERPL CALC-MCNC: 38.2 MG/DL (ref 0–100)
MCH RBC QN AUTO: 28.5 PG (ref 26–34)
MCHC RBC AUTO-ENTMCNC: 31.9 G/DL (ref 30–36.5)
MCV RBC AUTO: 89.2 FL (ref 80–99)
NRBC # BLD: 0 K/UL (ref 0–0.01)
NRBC BLD-RTO: 0 PER 100 WBC
PLATELET # BLD AUTO: 238 K/UL (ref 150–400)
PMV BLD AUTO: 10.5 FL (ref 8.9–12.9)
POTASSIUM SERPL-SCNC: 4.2 MMOL/L (ref 3.5–5.1)
PROT SERPL-MCNC: 7 G/DL (ref 6.4–8.2)
RBC # BLD AUTO: 4.46 M/UL (ref 3.8–5.2)
SODIUM SERPL-SCNC: 140 MMOL/L (ref 136–145)
TRIGL SERPL-MCNC: 174 MG/DL
VIT B12 SERPL-MCNC: 1973 PG/ML (ref 193–986)
VLDLC SERPL CALC-MCNC: 34.8 MG/DL
WBC # BLD AUTO: 6.6 K/UL (ref 3.6–11)

## 2023-11-09 PROCEDURE — 99204 OFFICE O/P NEW MOD 45 MIN: CPT | Performed by: FAMILY MEDICINE

## 2023-11-09 RX ORDER — FUROSEMIDE 20 MG/1
TABLET ORAL
COMMUNITY
Start: 2023-10-24

## 2023-11-09 RX ORDER — VENLAFAXINE HYDROCHLORIDE 75 MG/1
75 CAPSULE, EXTENDED RELEASE ORAL DAILY
Qty: 90 CAPSULE | Refills: 1 | Status: SHIPPED | OUTPATIENT
Start: 2023-11-09

## 2023-11-09 RX ORDER — KETOROLAC TROMETHAMINE 10 MG/1
TABLET, FILM COATED ORAL
COMMUNITY
Start: 2022-09-27

## 2023-11-09 RX ORDER — UBIDECARENONE 75 MG
50 CAPSULE ORAL DAILY
COMMUNITY

## 2023-11-09 RX ORDER — LORAZEPAM 1 MG/1
TABLET ORAL
COMMUNITY

## 2023-11-09 RX ORDER — LIDOCAINE AND PRILOCAINE 25; 25 MG/G; MG/G
CREAM TOPICAL
COMMUNITY
Start: 2023-03-25

## 2023-11-09 SDOH — ECONOMIC STABILITY: INCOME INSECURITY: HOW HARD IS IT FOR YOU TO PAY FOR THE VERY BASICS LIKE FOOD, HOUSING, MEDICAL CARE, AND HEATING?: PATIENT DECLINED

## 2023-11-09 SDOH — ECONOMIC STABILITY: HOUSING INSECURITY
IN THE LAST 12 MONTHS, WAS THERE A TIME WHEN YOU DID NOT HAVE A STEADY PLACE TO SLEEP OR SLEPT IN A SHELTER (INCLUDING NOW)?: PATIENT REFUSED

## 2023-11-09 SDOH — ECONOMIC STABILITY: FOOD INSECURITY: WITHIN THE PAST 12 MONTHS, THE FOOD YOU BOUGHT JUST DIDN'T LAST AND YOU DIDN'T HAVE MONEY TO GET MORE.: PATIENT DECLINED

## 2023-11-09 SDOH — ECONOMIC STABILITY: FOOD INSECURITY: WITHIN THE PAST 12 MONTHS, YOU WORRIED THAT YOUR FOOD WOULD RUN OUT BEFORE YOU GOT MONEY TO BUY MORE.: PATIENT DECLINED

## 2023-11-09 ASSESSMENT — PATIENT HEALTH QUESTIONNAIRE - PHQ9
1. LITTLE INTEREST OR PLEASURE IN DOING THINGS: 0
SUM OF ALL RESPONSES TO PHQ9 QUESTIONS 1 & 2: 1
SUM OF ALL RESPONSES TO PHQ QUESTIONS 1-9: 1
2. FEELING DOWN, DEPRESSED OR HOPELESS: 1
SUM OF ALL RESPONSES TO PHQ QUESTIONS 1-9: 1

## 2023-11-14 LAB — DRUGS UR: NORMAL

## 2023-12-14 ENCOUNTER — HOSPITAL ENCOUNTER (OUTPATIENT)
Facility: HOSPITAL | Age: 48
Discharge: HOME | End: 2023-12-16
Attending: INTERNAL MEDICINE
Payer: COMMERCIAL

## 2023-12-14 DIAGNOSIS — R19.7 DIARRHEA, UNSPECIFIED TYPE: ICD-10-CM

## 2023-12-14 DIAGNOSIS — Z01.810 ENCOUNTER FOR PREPROCEDURAL CARDIOVASCULAR EXAMINATION: ICD-10-CM

## 2023-12-14 DIAGNOSIS — C50.311 MALIGNANT NEOPLASM OF LOWER-INNER QUADRANT OF RIGHT FEMALE BREAST, UNSPECIFIED ESTROGEN RECEPTOR STATUS (HCC): ICD-10-CM

## 2023-12-14 DIAGNOSIS — Z51.11 ENCOUNTER FOR ANTINEOPLASTIC CHEMOTHERAPY: ICD-10-CM

## 2023-12-14 DIAGNOSIS — Z51.12 ENCOUNTER FOR ANTINEOPLASTIC IMMUNOTHERAPY: ICD-10-CM

## 2023-12-14 LAB
ECHO AO ROOT DIAM: 2.3 CM
ECHO AR MAX VEL PISA: 4.9 M/S
ECHO AV AREA PEAK VELOCITY: 2.8 CM2
ECHO AV AREA PLAN: 1.6 CM2
ECHO AV MEAN GRADIENT: 3 MMHG
ECHO AV MEAN VELOCITY: 0.9 M/S
ECHO AV PEAK GRADIENT: 7 MMHG
ECHO AV PEAK VELOCITY: 1.3 M/S
ECHO AV REGURGITANT PHT: 642.7 MILLISECOND
ECHO AV VELOCITY RATIO: 1
ECHO AV VTI: 22.7 CM
ECHO EST RA PRESSURE: 5 MMHG
ECHO LA DIAMETER: 3.3 CM
ECHO LA TO AORTIC ROOT RATIO: 1.43
ECHO LA VOL A-L A4C: 29 ML (ref 22–52)
ECHO LA VOL MOD A4C: 27 ML (ref 22–52)
ECHO LV EDV A4C: 77 ML
ECHO LV EJECTION FRACTION A4C: 49 %
ECHO LV ESV A4C: 39 ML
ECHO LV FRACTIONAL SHORTENING: 34 % (ref 28–44)
ECHO LV INTERNAL DIMENSION DIASTOLIC: 4.4 CM (ref 3.9–5.3)
ECHO LV INTERNAL DIMENSION SYSTOLIC: 2.9 CM
ECHO LV IVSD: 0.8 CM (ref 0.6–0.9)
ECHO LV MASS 2D: 118.6 G (ref 67–162)
ECHO LV POSTERIOR WALL DIASTOLIC: 0.9 CM (ref 0.6–0.9)
ECHO LV RELATIVE WALL THICKNESS RATIO: 0.41
ECHO LVOT AREA: 2.8 CM2
ECHO LVOT DIAM: 1.9 CM
ECHO LVOT PEAK GRADIENT: 7 MMHG
ECHO LVOT PEAK VELOCITY: 1.3 M/S
ECHO MV A VELOCITY: 0.48 M/S
ECHO MV AREA PHT: 5.7 CM2
ECHO MV E DECELERATION TIME (DT): 90.7 MS
ECHO MV E VELOCITY: 0.45 M/S
ECHO MV E/A RATIO: 0.94
ECHO MV MAX VELOCITY: 0.8 M/S
ECHO MV MEAN GRADIENT: 1 MMHG
ECHO MV MEAN VELOCITY: 0.5 M/S
ECHO MV PEAK GRADIENT: 2 MMHG
ECHO MV PRESSURE HALF TIME (PHT): 38.7 MS
ECHO MV VTI: 16.2 CM
ECHO PULMONARY ARTERY END DIASTOLIC PRESSURE: 9 MMHG
ECHO PV MAX VELOCITY: 0.8 M/S
ECHO PV PEAK GRADIENT: 2 MMHG
ECHO RIGHT VENTRICULAR SYSTOLIC PRESSURE (RVSP): 31 MMHG
ECHO TV REGURGITANT MAX VELOCITY: 2.56 M/S
ECHO TV REGURGITANT PEAK GRADIENT: 27 MMHG

## 2023-12-14 PROCEDURE — 93306 TTE W/DOPPLER COMPLETE: CPT | Performed by: SPECIALIST

## 2023-12-14 PROCEDURE — 93306 TTE W/DOPPLER COMPLETE: CPT

## 2024-02-02 ENCOUNTER — TELEPHONE (OUTPATIENT)
Dept: PRIMARY CARE CLINIC | Facility: CLINIC | Age: 49
End: 2024-02-02

## 2024-02-02 NOTE — TELEPHONE ENCOUNTER
----- Message from Dafne Belem sent at 2/2/2024 10:28 AM EST -----  Subject: Message to Provider    QUESTIONS  Information for Provider? Patient tested positive for COVID-19 on 2/1 &   symptoms started on 1/31. Patient wants to know if she can still come in   to appt on 2/6/2024 @ 8 AM. Please call & advise?   ---------------------------------------------------------------------------  --------------  CALL BACK INFO  [phone]; OK to leave message on voicemail  ---------------------------------------------------------------------------  --------------  SCRIPT ANSWERS  Relationship to Patient? Self

## 2024-02-02 NOTE — TELEPHONE ENCOUNTER
Called patient and asked if she would be okay with changing her appt next week to vv. Patient is okay with this. Will change to vv

## 2024-02-06 ENCOUNTER — TELEMEDICINE (OUTPATIENT)
Dept: PRIMARY CARE CLINIC | Facility: CLINIC | Age: 49
End: 2024-02-06
Payer: COMMERCIAL

## 2024-02-06 DIAGNOSIS — F32.A DEPRESSION, UNSPECIFIED DEPRESSION TYPE: ICD-10-CM

## 2024-02-06 DIAGNOSIS — F41.9 ANXIETY: Primary | ICD-10-CM

## 2024-02-06 PROCEDURE — 99214 OFFICE O/P EST MOD 30 MIN: CPT | Performed by: FAMILY MEDICINE

## 2024-02-06 RX ORDER — VENLAFAXINE HYDROCHLORIDE 75 MG/1
75 CAPSULE, EXTENDED RELEASE ORAL DAILY
Qty: 90 CAPSULE | Refills: 3 | Status: SHIPPED | OUTPATIENT
Start: 2024-02-06

## 2024-02-06 RX ORDER — LORAZEPAM 1 MG/1
TABLET ORAL
Qty: 15 TABLET | Refills: 0 | Status: SHIPPED | OUTPATIENT
Start: 2024-02-06 | End: 2025-02-06

## 2024-02-06 ASSESSMENT — PATIENT HEALTH QUESTIONNAIRE - PHQ9
SUM OF ALL RESPONSES TO PHQ QUESTIONS 1-9: 1
2. FEELING DOWN, DEPRESSED OR HOPELESS: 1
1. LITTLE INTEREST OR PLEASURE IN DOING THINGS: 0
SUM OF ALL RESPONSES TO PHQ9 QUESTIONS 1 & 2: 1
SUM OF ALL RESPONSES TO PHQ QUESTIONS 1-9: 1

## 2024-02-06 NOTE — PROGRESS NOTES
\"Have you been to the ER, urgent care clinic since your last visit?  Hospitalized since your last visit?\"    NO    “Have you seen or consulted any other health care providers outside of [facility] since your last visit?”    NO    “Have you had a colorectal cancer screening such as a colonoscopy/FIT/Cologuard?    NO      “Have you had a pap smear?”    no

## 2024-02-06 NOTE — PROGRESS NOTES
Loren Cox (:  1975) is a Established patient, here for evaluation of the following:  Follow-up      Assessment & Plan   Below is the assessment and plan developed based on review of pertinent history, physical exam, labs, studies, and medications.  1. Anxiety  -     venlafaxine (EFFEXOR XR) 75 MG extended release capsule; Take 1 capsule by mouth daily, Disp-90 capsule, R-3Normal  -     LORazepam (ATIVAN) 1 MG tablet; lorazepam 1 mg tablet  TAKE ONE TABLET BY MOUTH ONE TIME DAILY AS NEEDED, Disp-15 tablet, R-0Normal  2. Depression, unspecified depression type  -     venlafaxine (EFFEXOR XR) 75 MG extended release capsule; Take 1 capsule by mouth daily, Disp-90 capsule, R-3Normal    Continue current dose of Effexor. Working well. Refilled.   Refilled Ativan.  reviewed and last fill >1 year ago. States she still has 9 tabs left from last year. Given I am going out on leave and med may have  will give fill of 15 tabs. She is aware we cannot give additional refills within the year. Controlled substance agreement is on file. Compliance screen was appropriate.        Subjective   Patient is currently on Effexor and Ativan. Switched to Effexor from Lexapro due to interaction with Tamoxifen. Has been referred to counseling and now seeing someone. She has been doing much better since she increased the dose of Effexor to 75mg daily. Has stopped seeing NN. States she has not had any adverse effects. Does feel it is helping with hot flashes from Tamoxifen. States she is sleeping well when she takes her Trazodone.           Patient Active Problem List   Diagnosis    Lymphadenopathy of head and neck        Current Outpatient Medications on File Prior to Visit   Medication Sig Dispense Refill    vitamin B-12 (CYANOCOBALAMIN) 100 MCG tablet Take 0.5 tablets by mouth daily      ketorolac (TORADOL) 10 MG tablet TAKE ONE TABLET BY MOUTH EVERY 6 HOURS AS NEEDED WITH FOOD OR MILK      furosemide (LASIX) 20 MG

## 2024-02-08 DIAGNOSIS — Z12.11 SCREENING FOR COLON CANCER: Primary | ICD-10-CM

## 2024-02-08 DIAGNOSIS — K21.9 GASTROESOPHAGEAL REFLUX DISEASE, UNSPECIFIED WHETHER ESOPHAGITIS PRESENT: ICD-10-CM

## 2024-02-20 ENCOUNTER — OFFICE VISIT (OUTPATIENT)
Age: 49
End: 2024-02-20
Payer: COMMERCIAL

## 2024-02-20 VITALS — HEIGHT: 62 IN | BODY MASS INDEX: 33.31 KG/M2 | WEIGHT: 181 LBS

## 2024-02-20 DIAGNOSIS — Z85.3 HISTORY OF BREAST CANCER IN FEMALE: ICD-10-CM

## 2024-02-20 DIAGNOSIS — Z98.890 S/P LUMPECTOMY OF BREAST: ICD-10-CM

## 2024-02-20 DIAGNOSIS — C50.311 MALIGNANT NEOPLASM OF LOWER-INNER QUADRANT OF RIGHT BREAST OF FEMALE, ESTROGEN RECEPTOR POSITIVE (HCC): Primary | ICD-10-CM

## 2024-02-20 DIAGNOSIS — Z17.0 MALIGNANT NEOPLASM OF LOWER-INNER QUADRANT OF RIGHT BREAST OF FEMALE, ESTROGEN RECEPTOR POSITIVE (HCC): Primary | ICD-10-CM

## 2024-02-20 DIAGNOSIS — Z92.3 S/P RADIATION THERAPY: ICD-10-CM

## 2024-02-20 PROCEDURE — 99213 OFFICE O/P EST LOW 20 MIN: CPT | Performed by: NURSE PRACTITIONER

## 2024-02-20 NOTE — PROGRESS NOTES
HISTORY OF PRESENT ILLNESS  Loren Cox is a 48 y.o. female     HPI Established patient presents for follow-up for RIGHT breast cancer.  Denies breast mass, skin changes, nipple discharge and pain.          Breast history -   Referring - Dr. Galvez - [facility]  10/14/22- US guided RIGHT breast biopsy ([facility]) - RIGHT breast IDC 3:00 grd 2, 6mm, ER 80%, DC 50%, KI-67 <5%, HER2 FISH pos  Mammaprint - high risk  22 RIGHT breast lumpectomy  and SLNB - Dr. Paulson  1.4cm IDC; ER pos, DC pos, HER2 pos; node negative 0/2 - T1cN0  1/3/23 - port placed - Dr. Paulson  2023 completed adjuvant chemotherapy - Dr. Lee  23 - completed XRT - Dr. Peterson  2023 - started tamoxifen  2024 - completed Herceptin - Dr. Lee         Family history -    Mother - breast cancer   Maternal grandmother - ocular cancer   Great uncle - lung cancer   Genetic testing at [facility] - negative per patient         OB History          1    Para        Term   1            AB        Living   1         SAB        IAB        Ectopic        Molar        Multiple        Live Births              Obstetric Comments   Menarche 12, LMP , # of children 1, age of 1st delivery 35, Hysterectomy/oophorectomy no/no, Breast bx yes, history of breast feeding yes, BCP yes, Hormone therapy no                    Past Surgical History:   Procedure Laterality Date    BREAST LUMPECTOMY Right 2022    RIGHT BREAST ULTRASOUND GUIDED LUMPECTOMY/RIGHT BREAST SENTINEL NODE BIOPSY performed by Shaniqua Paulson MD at [facility]    BREAST SURGERY      GYN          OTHER SURGICAL HISTORY      cat scratch fever, LYMPH NODE I&D       Review of Systems      Physical Exam  Constitutional:       Appearance: Normal appearance.   Chest:   Breasts:     Right: No mass, nipple discharge, skin change or tenderness.      Left: No mass, nipple discharge, skin change or tenderness.      Comments: RIGHT breast and axilla - tender at surgical

## 2024-03-19 ENCOUNTER — OFFICE VISIT (OUTPATIENT)
Dept: PRIMARY CARE CLINIC | Facility: CLINIC | Age: 49
End: 2024-03-19
Payer: COMMERCIAL

## 2024-03-19 VITALS
HEIGHT: 62 IN | WEIGHT: 183.4 LBS | HEART RATE: 76 BPM | RESPIRATION RATE: 16 BRPM | SYSTOLIC BLOOD PRESSURE: 119 MMHG | DIASTOLIC BLOOD PRESSURE: 80 MMHG | BODY MASS INDEX: 33.75 KG/M2 | OXYGEN SATURATION: 98 % | TEMPERATURE: 97.5 F

## 2024-03-19 DIAGNOSIS — R30.0 DYSURIA: ICD-10-CM

## 2024-03-19 DIAGNOSIS — Z87.442 HISTORY OF NEPHROLITHIASIS: Primary | ICD-10-CM

## 2024-03-19 DIAGNOSIS — R31.29 OTHER MICROSCOPIC HEMATURIA: ICD-10-CM

## 2024-03-19 LAB
BILIRUBIN, URINE, POC: NEGATIVE
BLOOD URINE, POC: ABNORMAL
GLUCOSE URINE, POC: NEGATIVE
KETONES, URINE, POC: NEGATIVE
LEUKOCYTE ESTERASE, URINE, POC: ABNORMAL
NITRITE, URINE, POC: NEGATIVE
PH, URINE, POC: 6.5 (ref 4.6–8)
PROTEIN,URINE, POC: NEGATIVE
SPECIFIC GRAVITY, URINE, POC: 1.02 (ref 1–1.03)
URINALYSIS CLARITY, POC: ABNORMAL
URINALYSIS COLOR, POC: YELLOW
UROBILINOGEN, POC: ABNORMAL

## 2024-03-19 PROCEDURE — 99204 OFFICE O/P NEW MOD 45 MIN: CPT | Performed by: NURSE PRACTITIONER

## 2024-03-19 PROCEDURE — 81003 URINALYSIS AUTO W/O SCOPE: CPT | Performed by: NURSE PRACTITIONER

## 2024-03-19 RX ORDER — TOPIRAMATE 25 MG/1
25 TABLET ORAL 2 TIMES DAILY
COMMUNITY
Start: 2024-01-05

## 2024-03-19 RX ORDER — TAMSULOSIN HYDROCHLORIDE 0.4 MG/1
0.4 CAPSULE ORAL DAILY PRN
Qty: 30 CAPSULE | Refills: 0 | Status: SHIPPED | OUTPATIENT
Start: 2024-03-19

## 2024-03-19 SDOH — ECONOMIC STABILITY: FOOD INSECURITY: WITHIN THE PAST 12 MONTHS, YOU WORRIED THAT YOUR FOOD WOULD RUN OUT BEFORE YOU GOT MONEY TO BUY MORE.: NEVER TRUE

## 2024-03-19 SDOH — ECONOMIC STABILITY: INCOME INSECURITY: HOW HARD IS IT FOR YOU TO PAY FOR THE VERY BASICS LIKE FOOD, HOUSING, MEDICAL CARE, AND HEATING?: NOT HARD AT ALL

## 2024-03-19 SDOH — ECONOMIC STABILITY: FOOD INSECURITY: WITHIN THE PAST 12 MONTHS, THE FOOD YOU BOUGHT JUST DIDN'T LAST AND YOU DIDN'T HAVE MONEY TO GET MORE.: NEVER TRUE

## 2024-03-19 SDOH — ECONOMIC STABILITY: HOUSING INSECURITY
IN THE LAST 12 MONTHS, WAS THERE A TIME WHEN YOU DID NOT HAVE A STEADY PLACE TO SLEEP OR SLEPT IN A SHELTER (INCLUDING NOW)?: NO

## 2024-03-19 ASSESSMENT — PATIENT HEALTH QUESTIONNAIRE - PHQ9
2. FEELING DOWN, DEPRESSED OR HOPELESS: SEVERAL DAYS
SUM OF ALL RESPONSES TO PHQ QUESTIONS 1-9: 2
SUM OF ALL RESPONSES TO PHQ9 QUESTIONS 1 & 2: 2
SUM OF ALL RESPONSES TO PHQ QUESTIONS 1-9: 2
SUM OF ALL RESPONSES TO PHQ QUESTIONS 1-9: 2
1. LITTLE INTEREST OR PLEASURE IN DOING THINGS: SEVERAL DAYS
SUM OF ALL RESPONSES TO PHQ QUESTIONS 1-9: 2

## 2024-03-19 ASSESSMENT — ENCOUNTER SYMPTOMS
ABDOMINAL PAIN: 0
ABDOMINAL DISTENTION: 0

## 2024-03-19 NOTE — PROGRESS NOTES
\"Have you been to the ER, urgent care clinic since your last visit?  Hospitalized since your last visit?\"    no    “Have you seen or consulted any other health care providers outside of [facility] since your last visit?”    Neurology Dr. Arnaldo Sloan  [facility] [address] Schmidt     “Have you had a pap smear?”      12/07/24 Khalida Galvez           “Have you had a colorectal cancer screening such as a colonoscopy/FIT/Cologuard?    Never    Click Here for Release of Records Request

## 2024-03-19 NOTE — PROGRESS NOTES
[facility]   [address], [address]  [place] [address]  P: [phone]  F: [phone]    SUBJECTIVE     HPI:     Loren Cox is a 48 y.o. female who is seen in the clinic for   Chief Complaint   Patient presents with    Urinary Pain     At the end of urination.H/O Kidney stones.    Hematuria    Urinary Urgency        The patient with a PMH of Anxiety, Depression, Insomnia, B12 Deficiency, Vitamin D Deficiency, Prediabetes, Hypothyroidism during pregnancy, Obesity, Migraines, Asthma, Right Breast Cancer, and Nephrolithiasis presents to the office today with urinary complaints that began 4 days prior.    AMB POC UA today shows:    Large Amounts of Blood   Negative for Nitrites.   Small Amounts of Leukocyte Esterase.     The patient's PCP is Dr. Williamson.     Patient Active Problem List   Diagnosis    Lymphadenopathy of head and neck        Past Medical History:   Diagnosis Date    Asthma     no inhaler. allergy induced    Cancer (HCC)     IDC, RT BREAST    Ill-defined condition     MIGRAINES    Nausea & vomiting     Prediabetes     Thyroid disease     HYPOTHYROID DURING PREGNANCY, NO MEDS NOW     Past Surgical History:   Procedure Laterality Date    BREAST LUMPECTOMY Right 2022    RIGHT BREAST ULTRASOUND GUIDED LUMPECTOMY/RIGHT BREAST SENTINEL NODE BIOPSY performed by Shaniqua Paulson MD at [facility] AMBULATORY OR    BREAST SURGERY      GYN          OTHER SURGICAL HISTORY      cat scratch fever, LYMPH NODE I&D     Social History     Socioeconomic History    Marital status:      Spouse name: Not on file    Number of children: Not on file    Years of education: Not on file    Highest education level: Not on file   Occupational History    Not on file   Tobacco Use    Smoking status: Never    Smokeless tobacco: Never   Vaping Use    Vaping Use: Never used   Substance and Sexual Activity    Alcohol use: Yes    Drug use: No    Sexual activity: Yes

## 2024-03-25 ENCOUNTER — TELEPHONE (OUTPATIENT)
Dept: PRIMARY CARE CLINIC | Facility: CLINIC | Age: 49
End: 2024-03-25

## 2024-03-25 NOTE — TELEPHONE ENCOUNTER
----- Message from Erin Whitlock sent at 3/25/2024 12:36 PM EDT -----  Subject: Referral Request    Reason for referral request? Pt states rec'd [facility] appt but [facility] need [facility]ed referral w/all the notes, records on this   issue. Fax [phone]. Pt states will get her xray on Wed afternoon.   Should be sent also.  Provider patient wants to be referred to(if known):     Provider Phone Number(if known):    Additional Information for Provider?   ---------------------------------------------------------------------------  --------------  CALL BACK INFO    [phone]; OK to leave message on voicemail  ---------------------------------------------------------------------------  --------------

## 2024-03-27 ENCOUNTER — HOSPITAL ENCOUNTER (OUTPATIENT)
Facility: HOSPITAL | Age: 49
Discharge: HOME | End: 2024-03-30
Payer: COMMERCIAL

## 2024-03-27 DIAGNOSIS — R31.29 OTHER MICROSCOPIC HEMATURIA: ICD-10-CM

## 2024-03-27 DIAGNOSIS — Z87.442 HISTORY OF NEPHROLITHIASIS: ICD-10-CM

## 2024-03-27 DIAGNOSIS — R30.0 DYSURIA: ICD-10-CM

## 2024-03-27 PROCEDURE — 74018 RADEX ABDOMEN 1 VIEW: CPT

## 2024-04-15 ENCOUNTER — TELEPHONE (OUTPATIENT)
Age: 49
End: 2024-04-15

## 2024-04-18 ENCOUNTER — CLINICAL DOCUMENTATION (OUTPATIENT)
Age: 49
End: 2024-04-18

## 2024-04-18 ENCOUNTER — PREP FOR PROCEDURE (OUTPATIENT)
Age: 49
End: 2024-04-18

## 2024-04-18 DIAGNOSIS — C50.311 MALIGNANT NEOPLASM OF LOWER-INNER QUADRANT OF RIGHT BREAST OF FEMALE, ESTROGEN RECEPTOR POSITIVE (HCC): Primary | ICD-10-CM

## 2024-04-18 DIAGNOSIS — Z17.0 MALIGNANT NEOPLASM OF LOWER-INNER QUADRANT OF RIGHT BREAST OF FEMALE, ESTROGEN RECEPTOR POSITIVE (HCC): Primary | ICD-10-CM

## 2024-04-18 NOTE — PROGRESS NOTES
Patient Surgery Information Sheet      Patient Name:  Loren Cox  Surgery Date:  May 7, 2023    Type of Surgery:  PORT REMOVAL     Estimated arrival time 9:00 AM    Arrival time will be confirmed the afternoon before your surgery.    Pre-procedure: Not Applicable    Pre-Operative Testing Department will call to schedule pre-op testing appointment if needed before surgery    Hospital:  [facility]  Address:  [address] [place] [address]  Check in location:  Through the main entrance of [facility] directly to the left at Patient Access    Pre-Operative Instructions:  Will be given at the pre-op appointment.    Special Instructions if needed:     NPO (nothing by mouth) or drinking after midnight the night before Surgery  Patient may shower the morning of, do not use any lotion, deodorant, powders, perfumes or makeup  Patient will need  the morning of surgery     POST OPERATIVE VISIT: 5/28/2024 at 10:15 am with Salud Mcdonnell    Surgery Scheduler:   Yamilka Cabello

## 2024-04-19 ENCOUNTER — COMMUNITY OUTREACH (OUTPATIENT)
Dept: PRIMARY CARE CLINIC | Facility: CLINIC | Age: 49
End: 2024-04-19

## 2024-04-22 ENCOUNTER — CLINICAL DOCUMENTATION (OUTPATIENT)
Age: 49
End: 2024-04-22

## 2024-05-06 ENCOUNTER — ANESTHESIA EVENT (OUTPATIENT)
Facility: HOSPITAL | Age: 49
End: 2024-05-06
Payer: COMMERCIAL

## 2024-05-07 ENCOUNTER — HOSPITAL ENCOUNTER (OUTPATIENT)
Facility: HOSPITAL | Age: 49
Setting detail: OUTPATIENT SURGERY
Discharge: HOME | End: 2024-05-07
Attending: SURGERY | Admitting: SURGERY
Payer: COMMERCIAL

## 2024-05-07 ENCOUNTER — ANESTHESIA (OUTPATIENT)
Facility: HOSPITAL | Age: 49
End: 2024-05-07
Payer: COMMERCIAL

## 2024-05-07 VITALS
DIASTOLIC BLOOD PRESSURE: 73 MMHG | HEART RATE: 87 BPM | BODY MASS INDEX: 33.31 KG/M2 | HEIGHT: 62 IN | OXYGEN SATURATION: 94 % | SYSTOLIC BLOOD PRESSURE: 121 MMHG | TEMPERATURE: 97.7 F | RESPIRATION RATE: 17 BRPM | WEIGHT: 181 LBS

## 2024-05-07 DIAGNOSIS — Z17.0 MALIGNANT NEOPLASM OF LOWER-INNER QUADRANT OF RIGHT BREAST OF FEMALE, ESTROGEN RECEPTOR POSITIVE (HCC): ICD-10-CM

## 2024-05-07 DIAGNOSIS — C50.311 MALIGNANT NEOPLASM OF LOWER-INNER QUADRANT OF RIGHT BREAST OF FEMALE, ESTROGEN RECEPTOR POSITIVE (HCC): ICD-10-CM

## 2024-05-07 PROCEDURE — 2709999900 HC NON-CHARGEABLE SUPPLY: Performed by: SURGERY

## 2024-05-07 PROCEDURE — 36590 REMOVAL TUNNELED CV CATH: CPT | Performed by: SURGERY

## 2024-05-07 PROCEDURE — 2580000003 HC RX 258: Performed by: NURSE ANESTHETIST, CERTIFIED REGISTERED

## 2024-05-07 PROCEDURE — 3700000000 HC ANESTHESIA ATTENDED CARE: Performed by: SURGERY

## 2024-05-07 PROCEDURE — 6360000002 HC RX W HCPCS: Performed by: NURSE ANESTHETIST, CERTIFIED REGISTERED

## 2024-05-07 PROCEDURE — 6360000002 HC RX W HCPCS: Performed by: SURGERY

## 2024-05-07 PROCEDURE — 3600000012 HC SURGERY LEVEL 2 ADDTL 15MIN: Performed by: SURGERY

## 2024-05-07 PROCEDURE — 3600000002 HC SURGERY LEVEL 2 BASE: Performed by: SURGERY

## 2024-05-07 PROCEDURE — 2500000003 HC RX 250 WO HCPCS: Performed by: SURGERY

## 2024-05-07 PROCEDURE — 7100000001 HC PACU RECOVERY - ADDTL 15 MIN: Performed by: SURGERY

## 2024-05-07 PROCEDURE — 2500000003 HC RX 250 WO HCPCS: Performed by: NURSE ANESTHETIST, CERTIFIED REGISTERED

## 2024-05-07 PROCEDURE — 7100000000 HC PACU RECOVERY - FIRST 15 MIN: Performed by: SURGERY

## 2024-05-07 PROCEDURE — 3700000001 HC ADD 15 MINUTES (ANESTHESIA): Performed by: SURGERY

## 2024-05-07 RX ORDER — MIDAZOLAM HYDROCHLORIDE 1 MG/ML
INJECTION INTRAMUSCULAR; INTRAVENOUS PRN
Status: DISCONTINUED | OUTPATIENT
Start: 2024-05-07 | End: 2024-05-07 | Stop reason: SDUPTHER

## 2024-05-07 RX ORDER — FENTANYL CITRATE 50 UG/ML
INJECTION, SOLUTION INTRAMUSCULAR; INTRAVENOUS PRN
Status: DISCONTINUED | OUTPATIENT
Start: 2024-05-07 | End: 2024-05-07 | Stop reason: SDUPTHER

## 2024-05-07 RX ORDER — PROPOFOL 10 MG/ML
INJECTION, EMULSION INTRAVENOUS PRN
Status: DISCONTINUED | OUTPATIENT
Start: 2024-05-07 | End: 2024-05-07 | Stop reason: SDUPTHER

## 2024-05-07 RX ORDER — SODIUM CHLORIDE, SODIUM LACTATE, POTASSIUM CHLORIDE, CALCIUM CHLORIDE 600; 310; 30; 20 MG/100ML; MG/100ML; MG/100ML; MG/100ML
INJECTION, SOLUTION INTRAVENOUS CONTINUOUS PRN
Status: DISCONTINUED | OUTPATIENT
Start: 2024-05-07 | End: 2024-05-07 | Stop reason: SDUPTHER

## 2024-05-07 RX ORDER — LIDOCAINE HYDROCHLORIDE 20 MG/ML
INJECTION, SOLUTION EPIDURAL; INFILTRATION; INTRACAUDAL; PERINEURAL PRN
Status: DISCONTINUED | OUTPATIENT
Start: 2024-05-07 | End: 2024-05-07 | Stop reason: SDUPTHER

## 2024-05-07 RX ADMIN — SODIUM CHLORIDE, POTASSIUM CHLORIDE, SODIUM LACTATE AND CALCIUM CHLORIDE: 600; 310; 30; 20 INJECTION, SOLUTION INTRAVENOUS at 11:13

## 2024-05-07 RX ADMIN — MIDAZOLAM 2 MG: 1 INJECTION INTRAMUSCULAR; INTRAVENOUS at 11:13

## 2024-05-07 RX ADMIN — PROPOFOL 50 MG: 10 INJECTION, EMULSION INTRAVENOUS at 11:18

## 2024-05-07 RX ADMIN — LIDOCAINE HYDROCHLORIDE 100 MG: 20 INJECTION, SOLUTION EPIDURAL; INFILTRATION; INTRACAUDAL; PERINEURAL at 11:18

## 2024-05-07 RX ADMIN — FENTANYL CITRATE 25 MCG: 50 INJECTION INTRAMUSCULAR; INTRAVENOUS at 11:26

## 2024-05-07 RX ADMIN — FENTANYL CITRATE 50 MCG: 50 INJECTION INTRAMUSCULAR; INTRAVENOUS at 11:17

## 2024-05-07 RX ADMIN — FENTANYL CITRATE 25 MCG: 50 INJECTION INTRAMUSCULAR; INTRAVENOUS at 11:22

## 2024-05-07 RX ADMIN — PROPOFOL 30 MG: 10 INJECTION, EMULSION INTRAVENOUS at 11:24

## 2024-05-07 RX ADMIN — PROPOFOL 80 MCG/KG/MIN: 10 INJECTION, EMULSION INTRAVENOUS at 11:19

## 2024-05-07 ASSESSMENT — PAIN SCALES - GENERAL
PAINLEVEL_OUTOF10: 0
PAINLEVEL_OUTOF10: 0

## 2024-05-07 ASSESSMENT — PAIN - FUNCTIONAL ASSESSMENT: PAIN_FUNCTIONAL_ASSESSMENT: 0-10

## 2024-05-07 NOTE — DISCHARGE INSTRUCTIONS
Discharge Instructions from Dr. Paulson    You may shower, but no hot tubs, swimming pools, or baths until your incision is healed.  No heavy lifting with the affected extremity (nothing greater than 5 pounds), and limit its use for the next 4-5 days.  You may use an ice pack for comfort for the next couple of days, but do not place ice directly on the skin.  Rather, use a towel or clothing to serve as a barrier between skin and ice to prevent injury.  If I placed a drain, follow the drain instructions provided, especially as you keep a record of the drain output.  Follow medication instructions carefully. No narcotics given for port removal  Wear surgical bra for 24 hours, then remove. Wear supportive bra at all times.  You will have bruising and swelling  Watch for signs of infection as listed below.  Redness  Swelling  Drainage from the incision or from your nipple that appears infected  Fever over 101.5 degrees for consecutive readings, or over 99.5 if you are currently undergoing chemotherapy.  Call our office (number is below) for a follow-up appointment.  If you have any problems, our phone number is [phone]

## 2024-05-07 NOTE — BRIEF OP NOTE
Brief Postoperative Note      Patient: Loren Cox  YOB: 1975  MRN: [id number]    Date of Procedure: 5/7/2024    Pre-Op Diagnosis Codes:     * Malignant neoplasm of lower-inner quadrant of right female breast (HCC) [C50.311]    Post-Op Diagnosis: Same       Procedure(s):  PORT REMOVAL    Surgeon(s):  Shaniqua Paulson MD    Assistant:  * No surgical staff found *    Anesthesia: Monitor Anesthesia Care    Estimated Blood Loss (mL): Minimal    Complications: None    Specimens:   * No specimens in log *    Implants:  * No implants in log *      Drains: * No LDAs found *    Findings:  Infection Present At Time Of Surgery (PATOS) (choose all levels that have infection present):  No infection present  Other Findings: port removed intact    Electronically signed by Shaniqua Paulson MD on 5/7/2024 at 11:42 AM

## 2024-05-07 NOTE — H&P
HISTORY OF PRESENT ILLNESS  Loren Cox is a 48 y.o. female      HPI Established patient presents for follow-up for RIGHT breast cancer.  Denies breast mass, skin changes, nipple discharge and pain.          Breast history -   Referring - Dr. Galvez - [facility]  10/14/22- US guided RIGHT breast biopsy ([facility]) - RIGHT breast IDC 3:00 grd 2, 6mm, ER 80%, CT 50%, KI-67 <5%, HER2 FISH pos  Mammaprint - high risk  22 RIGHT breast lumpectomy  and SLNB - Dr. Paulson  1.4cm IDC; ER pos, CT pos, HER2 pos; node negative 0/2 - T1cN0  1/3/23 - port placed - Dr. Paulson  2023 completed adjuvant chemotherapy - Dr. Lee  23 - completed XRT - Dr. Peterson  2023 - started tamoxifen  2024 - completed Herceptin - Dr. Lee         Family history -    Mother - breast cancer   Maternal grandmother - ocular cancer   Great uncle - lung cancer   Genetic testing at [facility] - negative per patient            OB History            1    Para        Term   1            AB        Living   1           SAB        IAB        Ectopic        Molar        Multiple        Live Births               Obstetric Comments   Menarche 12, LMP , # of children 1, age of 1st delivery 35, Hysterectomy/oophorectomy no/no, Breast bx yes, history of breast feeding yes, BCP yes, Hormone therapy no                          Past Surgical History         Past Surgical History:   Procedure Laterality Date    BREAST LUMPECTOMY Right 2022     RIGHT BREAST ULTRASOUND GUIDED LUMPECTOMY/RIGHT BREAST SENTINEL NODE BIOPSY performed by Shaniqua Paulson MD at [facility] AMBULATORY OR    BREAST SURGERY       GYN            OTHER SURGICAL HISTORY        cat scratch fever, LYMPH NODE I&D            Review of Systems        Physical Exam  Constitutional:       Appearance: Normal appearance.   Chest:   Breasts:     Right: No mass, nipple discharge, skin change or tenderness.      Left: No mass, nipple discharge, skin change or tenderness.

## 2024-05-07 NOTE — OP NOTE
[address]  [address]                            OPERATIVE REPORT      PATIENT NAME: ANTOLIN CALLAHAN            : 1975  MED REC NO: [id number]                       ROOM: [facility]  ACCOUNT NO: [id number]                       ADMIT DATE: 2024  PROVIDER: Shaniqua Paulson MD    DATE OF SERVICE:  2024    PREOPERATIVE DIAGNOSES:  Right breast cancer.    POSTOPERATIVE DIAGNOSES:  Right breast cancer.    PROCEDURES PERFORMED:  Port removal.    SURGEON:  Shaniqua Paulson MD    ASSISTANT:  None.    ANESTHESIA:  MAC.    ESTIMATED BLOOD LOSS:  Minimal.    SPECIMENS REMOVED:  None.    INTRAOPERATIVE FINDINGS:  Port removed intact.     COMPLICATIONS:  None.    IMPLANTS:  None.    INDICATIONS:  A 48-year-old female, who had a port on left chest wall and needed removal.    DESCRIPTION OF PROCEDURE:  The patient was seen in the preop holding area, where surgical site was marked by surgeon and informed consent was obtained.  She was taken to the operating room and laid supine position, where MAC anesthesia was induced.  Left chest was prepped and draped in the usual fashion.  A time-out was performed.  Attention was turned to the left chest wall, where 20 mL of local anesthetic was injected.  The prior scar was opened with a 15 blade.  Bovie cautery was used to dissect into the port capsule.  The Prolene suture was removed on either side and then the port was removed intact.  The port tract was oversewn with interrupted 3-0 Vicryl.  Another, 10 mL of local anesthetic was injected.  The incision was closed with interrupted 3-0 Vicryl and 4-0 subcuticular Monocryl.  Skin glue was placed on the incision.  All sponge, needle, and instrument counts were correct.  The patient went to Recovery in stable condition.    DRAINS:  None.        MD MATTHEW THOMAS/AQS  D:  2024 11:45:38  T:  2024 17:29:44  JOB #:  [id number]

## 2024-05-07 NOTE — ANESTHESIA POSTPROCEDURE EVALUATION
Department of Anesthesiology  Postprocedure Note    Patient: Loren Cox  MRN: [id number]  YOB: 1975  Date of evaluation: 5/7/2024    Procedure Summary       Date: 05/07/24 Room / Location: [facility] ASU A2 / [facility] AMBULATORY OR    Anesthesia Start: 1113 Anesthesia Stop: 1156    Procedure: PORT REMOVAL (Left: Chest) Diagnosis:       Malignant neoplasm of lower-inner quadrant of right female breast (HCC)      (Malignant neoplasm of lower-inner quadrant of right female breast (HCC) [C50.311])    Surgeons: Shaniqua Paulson MD Responsible Provider: Sera Call DO    Anesthesia Type: MAC ASA Status: 3            Anesthesia Type: No value filed.    Nasir Phase I: Nasir Score: 10    Nasir Phase II:      Anesthesia Post Evaluation    Patient location during evaluation: PACU  Patient participation: complete - patient participated  Level of consciousness: awake and alert  Pain score: 0  Airway patency: patent  Nausea & Vomiting: no nausea and no vomiting  Cardiovascular status: blood pressure returned to baseline and hemodynamically stable  Respiratory status: acceptable and room air  Hydration status: euvolemic  Multimodal analgesia pain management approach  Pain management: adequate and satisfactory to patient    No notable events documented.

## 2024-05-07 NOTE — ANESTHESIA PRE PROCEDURE
BMI:   Wt Readings from Last 3 Encounters:   05/07/24 82.1 kg (181 lb)   03/19/24 83.2 kg (183 lb 6.4 oz)   02/20/24 82.1 kg (181 lb)     Body mass index is 33.11 kg/m².    CBC:   Lab Results   Component Value Date/Time    WBC 6.6 11/09/2023 09:47 AM    RBC 4.46 11/09/2023 09:47 AM    HGB 12.7 11/09/2023 09:47 AM    HCT 39.8 11/09/2023 09:47 AM    MCV 89.2 11/09/2023 09:47 AM    RDW 14.2 11/09/2023 09:47 AM     11/09/2023 09:47 AM       CMP:   Lab Results   Component Value Date/Time     11/09/2023 09:47 AM    K 4.2 11/09/2023 09:47 AM     11/09/2023 09:47 AM    CO2 28 11/09/2023 09:47 AM    BUN 16 11/09/2023 09:47 AM    CREATININE 0.62 11/09/2023 09:47 AM    GFRAA >60 01/31/2022 09:18 PM    AGRATIO 0.8 01/31/2022 09:18 PM    LABGLOM >60 11/09/2023 09:47 AM    LABGLOM >60 12/27/2022 02:28 PM    GLUCOSE 99 11/09/2023 09:47 AM    CALCIUM 8.9 11/09/2023 09:47 AM    BILITOT 0.5 11/09/2023 09:47 AM    ALKPHOS 51 11/09/2023 09:47 AM    ALKPHOS 41 01/31/2022 09:18 PM    AST 40 11/09/2023 09:47 AM    ALT 46 11/09/2023 09:47 AM       POC Tests: No results for input(s): \"POCGLU\", \"POCNA\", \"POCK\", \"POCCL\", \"POCBUN\", \"POCHEMO\", \"POCHCT\" in the last 72 hours.    Coags: No results found for: \"PROTIME\", \"INR\", \"APTT\"    HCG (If Applicable): No results found for: \"PREGTESTUR\", \"PREGSERUM\", \"HCG\", \"HCGQUANT\"     ABGs: No results found for: \"PHART\", \"PO2ART\", \"ESY5BOL\", \"ZHI8CMO\", \"BEART\", \"K0OAAXML\"     Type & Screen (If Applicable):  No results found for: \"LABABO\"    Drug/Infectious Status (If Applicable):  Lab Results   Component Value Date/Time    HEPCAB 0.07 11/09/2023 09:47 AM       COVID-19 Screening (If Applicable): No results found for: \"COVID19\"        Anesthesia Evaluation  Patient summary reviewed and Nursing notes reviewed   no history of anesthetic complications:   Airway: Mallampati: II  TM distance: >3 FB   Neck ROM: full  Mouth opening: > = 3 FB   Dental: normal exam

## 2024-05-28 ENCOUNTER — OFFICE VISIT (OUTPATIENT)
Age: 49
End: 2024-05-28

## 2024-05-28 VITALS — WEIGHT: 181 LBS | BODY MASS INDEX: 33.31 KG/M2 | HEIGHT: 62 IN

## 2024-05-28 DIAGNOSIS — Z17.0 MALIGNANT NEOPLASM OF LOWER-INNER QUADRANT OF RIGHT BREAST OF FEMALE, ESTROGEN RECEPTOR POSITIVE (HCC): Primary | ICD-10-CM

## 2024-05-28 DIAGNOSIS — Z98.890 S/P LUMPECTOMY OF BREAST: ICD-10-CM

## 2024-05-28 DIAGNOSIS — Z85.3 HISTORY OF BREAST CANCER IN FEMALE: ICD-10-CM

## 2024-05-28 DIAGNOSIS — Z92.3 S/P RADIATION THERAPY: ICD-10-CM

## 2024-05-28 DIAGNOSIS — Z98.890 HISTORY OF REMOVAL OF PORT-A-CATH: ICD-10-CM

## 2024-05-28 DIAGNOSIS — C50.311 MALIGNANT NEOPLASM OF LOWER-INNER QUADRANT OF RIGHT BREAST OF FEMALE, ESTROGEN RECEPTOR POSITIVE (HCC): Primary | ICD-10-CM

## 2024-05-28 PROCEDURE — 99024 POSTOP FOLLOW-UP VISIT: CPT | Performed by: NURSE PRACTITIONER

## 2024-05-28 NOTE — PROGRESS NOTES
HISTORY OF PRESENT ILLNESS  Loren Cox is a 48 y.o. female     HPI  Established patient presents for follow-up for RIGHT breast cancer.  Denies breast mass, skin changes, nipple discharge and pain.          Breast history -   Referring - Dr. Galvez - [facility]  10/14/22- US guided RIGHT breast biopsy ([facility]) - RIGHT breast IDC 3:00 grd 2, 6mm, ER 80%, NC 50%, KI-67 <5%, HER2 FISH pos  Mammaprint - high risk  22 RIGHT breast lumpectomy  and SLNB - Dr. Paulson  1.4cm IDC; ER pos, NC pos, HER2 pos; node negative 0/2 - T1cN0  1/3/23 - port placed - Dr. Paulson  2023 completed adjuvant chemotherapy - Dr. Lee  23 - completed XRT - Dr. Peterson  2023 - started tamoxifen  2024 - completed Herceptin - Dr. Lee  24 - port removal - Dr. Paulson         Family history -    Mother - breast cancer   Maternal grandmother - ocular cancer   Great uncle - lung cancer   Genetic testing at [facility] - negative per patient       OB History          1    Para        Term   1            AB        Living   1         SAB        IAB        Ectopic        Molar        Multiple        Live Births              Obstetric Comments   Menarche 12, LMP , # of children 1, age of 1st delivery 35, Hysterectomy/oophorectomy no/no, Breast bx yes, history of breast feeding yes, BCP yes, Hormone therapy no                  Past Surgical History:   Procedure Laterality Date    BREAST LUMPECTOMY Right 2022    RIGHT BREAST ULTRASOUND GUIDED LUMPECTOMY/RIGHT BREAST SENTINEL NODE BIOPSY performed by Shaniqua Paulson MD at [facility] AMBULATORY OR    BREAST SURGERY      GYN          OTHER SURGICAL HISTORY      cat scratch fever, LYMPH NODE I&D    PORT SURGERY Left 2024    PORT REMOVAL performed by Shaniqua Paulson MD at [facility] AMBULATORY OR           Review of Systems      Physical Exam  Chest:            Ht 1.575 m (5' 2\")   Wt 82.1 kg (181 lb)   BMI 33.11 kg/m²       ASSESSMENT and PLAN   Diagnosis Orders

## 2024-08-21 ENCOUNTER — TELEPHONE (OUTPATIENT)
Dept: PRIMARY CARE CLINIC | Facility: CLINIC | Age: 49
End: 2024-08-21

## 2024-08-21 NOTE — TELEPHONE ENCOUNTER
----- Message from Sandy S sent at 8/21/2024  1:17 PM EDT -----  Regarding: ECC Appointment Request  ECC Appointment Request    Patient needs appointment for ECC Appointment Type: Existing Condition Follow Up.    Patient Requested Dates(s): As soon as possible  Patient Requested Time: earliest in the morning or latest in the afternoon  Provider Name: Dr Sera Williamson    Reason for Appointment Request: Established Patient - Available appointments did not meet patient need. For liver enzymes results discussion  --------------------------------------------------------------------------------------------------------------------------    Relationship to Patient: Self     Call Back Information: OK to leave message on voicemail  Preferred Call Back Number: Phone [phone] (home)

## 2024-08-23 ENCOUNTER — TELEMEDICINE (OUTPATIENT)
Dept: PRIMARY CARE CLINIC | Facility: CLINIC | Age: 49
End: 2024-08-23
Payer: COMMERCIAL

## 2024-08-23 DIAGNOSIS — R31.0 GROSS HEMATURIA: ICD-10-CM

## 2024-08-23 DIAGNOSIS — R74.8 ELEVATED LIVER ENZYMES: ICD-10-CM

## 2024-08-23 DIAGNOSIS — R74.8 ELEVATED LIVER ENZYMES: Primary | ICD-10-CM

## 2024-08-23 PROCEDURE — 99214 OFFICE O/P EST MOD 30 MIN: CPT | Performed by: FAMILY MEDICINE

## 2024-08-23 NOTE — PROGRESS NOTES
Loren Cox (:  1975) is a Established patient, here for evaluation of the following:  Abnormal Lab      Assessment & Plan   Below is the assessment and plan developed based on review of pertinent history, physical exam, labs, studies, and medications.  1. Elevated liver enzymes - chronic >3 months. Unclear etiology of symptoms.   -     US ABDOMEN LIMITED; Future  -     Hepatic Function Panel; Future  -     Hepatitis Panel, Acute; Future  -     Ferritin; Future  2. Gross hematuria - Unclear etiology of symptoms.   -     US RETROPERITONEAL COMPLETE; Future  -     Urinalysis with Microscopic; Future           Subjective   HPI    Was first told her liver enzymes were high when she started chemo. Her metabolic panel before this was normal. Once she started chemo they went up into range they are at now. Dr. Vu had said they were not severely high and they continued going with the Taxol and Herceptin. Finished the Taxol in 2023 and they went back down. When she did the panel for us in Nov there was one that was mildly elevated. Had another panel in  and have steadily gone up over course of this year. She still has gallbladder. She has no pain in RUQ. She does take Naproxen. She has not been drinking much alcohol. Not taking Tylenol.     She had symptoms of a UTI and saw Senthil Messer in March. They did a UA which showed no WBCs but had RBCs in her urine. Was told she might have a kidney stone. Her discomfort continued so they did a KUB which didn't show anything. She said it was enough blood that she could wipe and see it. States this happened again and both times it was after she had intercourse. She saw a Uro gynecologist at [facility] 1 week later. No longer had blood in her urine but they did a US of her uterus that was fine. She is not sure if this is related.     Patient Active Problem List   Diagnosis    Lymphadenopathy of head and neck    Malignant neoplasm of lower-inner quadrant of right female

## 2024-08-26 DIAGNOSIS — R31.0 GROSS HEMATURIA: ICD-10-CM

## 2024-08-27 DIAGNOSIS — R79.89 ELEVATED FERRITIN: ICD-10-CM

## 2024-08-27 DIAGNOSIS — R74.8 ELEVATED LIVER ENZYMES: Primary | ICD-10-CM

## 2024-08-27 LAB
ALBUMIN SERPL-MCNC: 4.2 G/DL (ref 3.8–4.9)
ALP SERPL-CCNC: 54 IU/L (ref 44–121)
ALT SERPL-CCNC: 73 IU/L (ref 0–32)
APPEARANCE UR: ABNORMAL
AST SERPL-CCNC: 90 IU/L (ref 0–40)
BACTERIA #/AREA URNS HPF: ABNORMAL /[HPF]
BILIRUB DIRECT SERPL-MCNC: 0.12 MG/DL (ref 0–0.4)
BILIRUB SERPL-MCNC: 0.4 MG/DL (ref 0–1.2)
BILIRUB UR QL STRIP: NEGATIVE
CASTS URNS MICRO: ABNORMAL
CASTS URNS QL MICRO: PRESENT /LPF
COLOR UR: YELLOW
CRYSTALS URNS MICRO: ABNORMAL
EPI CELLS #/AREA URNS HPF: >10 /HPF (ref 0–10)
FERRITIN SERPL-MCNC: 185 NG/ML (ref 15–150)
GLUCOSE UR QL STRIP: NEGATIVE
HAV IGM SERPL QL IA: NEGATIVE
HBV CORE IGM SERPL QL IA: NEGATIVE
HBV SURFACE AG SERPL QL IA: NEGATIVE
HCV AB SERPL QL IA: NORMAL
HCV IGG SERPL QL IA: NON REACTIVE
HGB UR QL STRIP: NEGATIVE
KETONES UR QL STRIP: ABNORMAL
LEUKOCYTE ESTERASE UR QL STRIP: NEGATIVE
MICRO URNS: ABNORMAL
MICRO URNS: ABNORMAL
NITRITE UR QL STRIP: NEGATIVE
PH UR STRIP: 5.5 [PH] (ref 5–7.5)
PROT SERPL-MCNC: 6.9 G/DL (ref 6–8.5)
PROT UR QL STRIP: NEGATIVE
RBC #/AREA URNS HPF: ABNORMAL /HPF (ref 0–2)
SP GR UR STRIP: 1.03 (ref 1–1.03)
UNIDENT CRYS URNS QL MICRO: PRESENT
UROBILINOGEN UR STRIP-MCNC: 0.2 MG/DL (ref 0.2–1)
WBC #/AREA URNS HPF: ABNORMAL /HPF (ref 0–5)

## 2024-08-31 LAB
ANA SER QL: NEGATIVE
IRON SATN MFR SERPL: 17 % (ref 15–55)
IRON SERPL-MCNC: 58 UG/DL (ref 27–159)
TIBC SERPL-MCNC: 332 UG/DL (ref 250–450)
UIBC SERPL-MCNC: 274 UG/DL (ref 131–425)

## 2024-09-03 LAB
MITOCHONDRIA M2 IGG SER-ACNC: <20 UNITS (ref 0–20)
SMA IGG SER-ACNC: 5 UNITS (ref 0–19)

## 2024-09-04 ENCOUNTER — HOSPITAL ENCOUNTER (OUTPATIENT)
Facility: HOSPITAL | Age: 49
Discharge: HOME | End: 2024-09-07
Payer: COMMERCIAL

## 2024-09-04 DIAGNOSIS — R31.0 GROSS HEMATURIA: ICD-10-CM

## 2024-09-04 DIAGNOSIS — R74.8 ELEVATED LIVER ENZYMES: ICD-10-CM

## 2024-09-04 PROCEDURE — 76700 US EXAM ABDOM COMPLETE: CPT

## 2024-09-10 ENCOUNTER — TELEPHONE (OUTPATIENT)
Age: 49
End: 2024-09-10

## 2024-09-10 LAB
HFE GENE MUT ANL BLD/T: NORMAL
IMP & REVIEW OF LAB RESULTS: NORMAL

## 2024-09-26 ENCOUNTER — OFFICE VISIT (OUTPATIENT)
Age: 49
End: 2024-09-26
Payer: COMMERCIAL

## 2024-09-26 VITALS — HEIGHT: 62 IN | BODY MASS INDEX: 32.39 KG/M2 | WEIGHT: 176 LBS

## 2024-09-26 DIAGNOSIS — Z85.3 HISTORY OF BREAST CANCER IN FEMALE: ICD-10-CM

## 2024-09-26 DIAGNOSIS — Z92.3 S/P RADIATION THERAPY: ICD-10-CM

## 2024-09-26 DIAGNOSIS — C50.311 MALIGNANT NEOPLASM OF LOWER-INNER QUADRANT OF RIGHT BREAST OF FEMALE, ESTROGEN RECEPTOR POSITIVE (HCC): Primary | ICD-10-CM

## 2024-09-26 DIAGNOSIS — Z17.0 MALIGNANT NEOPLASM OF LOWER-INNER QUADRANT OF RIGHT BREAST OF FEMALE, ESTROGEN RECEPTOR POSITIVE (HCC): Primary | ICD-10-CM

## 2024-09-26 DIAGNOSIS — Z98.890 S/P LUMPECTOMY OF BREAST: ICD-10-CM

## 2024-09-26 PROCEDURE — 99213 OFFICE O/P EST LOW 20 MIN: CPT | Performed by: NURSE PRACTITIONER

## 2024-09-26 RX ORDER — PHENTERMINE HYDROCHLORIDE 15 MG/1
15 CAPSULE ORAL DAILY
COMMUNITY

## 2025-01-02 ENCOUNTER — HOSPITAL ENCOUNTER (OUTPATIENT)
Facility: HOSPITAL | Age: 50
End: 2025-01-02
Attending: INTERNAL MEDICINE
Payer: COMMERCIAL

## 2025-01-02 ENCOUNTER — HOSPITAL ENCOUNTER (OUTPATIENT)
Facility: HOSPITAL | Age: 50
Discharge: HOME | End: 2025-01-02
Attending: INTERNAL MEDICINE
Payer: COMMERCIAL

## 2025-01-02 DIAGNOSIS — M25.569 KNEE PAIN, UNSPECIFIED CHRONICITY, UNSPECIFIED LATERALITY: ICD-10-CM

## 2025-01-02 DIAGNOSIS — C50.311 MALIGNANT NEOPLASM OF LOWER-INNER QUADRANT OF RIGHT FEMALE BREAST, UNSPECIFIED ESTROGEN RECEPTOR STATUS (HCC): ICD-10-CM

## 2025-01-02 DIAGNOSIS — M25.559 ARTHRALGIA OF HIP, UNSPECIFIED LATERALITY: ICD-10-CM

## 2025-01-02 PROCEDURE — 3430000000 HC RX DIAGNOSTIC RADIOPHARMACEUTICAL: Performed by: INTERNAL MEDICINE

## 2025-01-02 PROCEDURE — A9503 TC99M MEDRONATE: HCPCS | Performed by: INTERNAL MEDICINE

## 2025-01-02 PROCEDURE — 78306 BONE IMAGING WHOLE BODY: CPT | Performed by: INTERNAL MEDICINE

## 2025-01-02 RX ORDER — TC 99M MEDRONATE 20 MG/10ML
21.7 INJECTION, POWDER, LYOPHILIZED, FOR SOLUTION INTRAVENOUS
Status: COMPLETED | OUTPATIENT
Start: 2025-01-02 | End: 2025-01-02

## 2025-01-02 RX ADMIN — TC 99M MEDRONATE 21.7 MILLICURIE: 20 INJECTION, POWDER, LYOPHILIZED, FOR SOLUTION INTRAVENOUS at 08:00

## 2025-01-16 ENCOUNTER — OFFICE VISIT (OUTPATIENT)
Age: 50
End: 2025-01-16

## 2025-01-16 VITALS
BODY MASS INDEX: 32.65 KG/M2 | WEIGHT: 177.4 LBS | SYSTOLIC BLOOD PRESSURE: 142 MMHG | HEIGHT: 62 IN | TEMPERATURE: 97.9 F | DIASTOLIC BLOOD PRESSURE: 85 MMHG | OXYGEN SATURATION: 99 % | HEART RATE: 83 BPM

## 2025-01-16 DIAGNOSIS — K75.81 METABOLIC DYSFUNCTION-ASSOCIATED STEATOHEPATITIS (MASH): Primary | ICD-10-CM

## 2025-01-16 DIAGNOSIS — R74.8 ELEVATED LIVER ENZYMES: ICD-10-CM

## 2025-01-16 ASSESSMENT — PATIENT HEALTH QUESTIONNAIRE - PHQ9
3. TROUBLE FALLING OR STAYING ASLEEP: NOT AT ALL
SUM OF ALL RESPONSES TO PHQ QUESTIONS 1-9: 0
SUM OF ALL RESPONSES TO PHQ QUESTIONS 1-9: 0
1. LITTLE INTEREST OR PLEASURE IN DOING THINGS: NOT AT ALL
SUM OF ALL RESPONSES TO PHQ9 QUESTIONS 1 & 2: 0
5. POOR APPETITE OR OVEREATING: NOT AT ALL
SUM OF ALL RESPONSES TO PHQ QUESTIONS 1-9: 0
2. FEELING DOWN, DEPRESSED OR HOPELESS: NOT AT ALL
7. TROUBLE CONCENTRATING ON THINGS, SUCH AS READING THE NEWSPAPER OR WATCHING TELEVISION: NOT AT ALL
10. IF YOU CHECKED OFF ANY PROBLEMS, HOW DIFFICULT HAVE THESE PROBLEMS MADE IT FOR YOU TO DO YOUR WORK, TAKE CARE OF THINGS AT HOME, OR GET ALONG WITH OTHER PEOPLE: NOT DIFFICULT AT ALL
9. THOUGHTS THAT YOU WOULD BE BETTER OFF DEAD, OR OF HURTING YOURSELF: NOT AT ALL
4. FEELING TIRED OR HAVING LITTLE ENERGY: NOT AT ALL
DEPRESSION UNABLE TO ASSESS: FUNCTIONAL CAPACITY MOTIVATION LIMITS ACCURACY
8. MOVING OR SPEAKING SO SLOWLY THAT OTHER PEOPLE COULD HAVE NOTICED. OR THE OPPOSITE, BEING SO FIGETY OR RESTLESS THAT YOU HAVE BEEN MOVING AROUND A LOT MORE THAN USUAL: NOT AT ALL
6. FEELING BAD ABOUT YOURSELF - OR THAT YOU ARE A FAILURE OR HAVE LET YOURSELF OR YOUR FAMILY DOWN: NOT AT ALL
SUM OF ALL RESPONSES TO PHQ QUESTIONS 1-9: 0

## 2025-01-16 ASSESSMENT — ANXIETY QUESTIONNAIRES
GAD7 TOTAL SCORE: 0
7. FEELING AFRAID AS IF SOMETHING AWFUL MIGHT HAPPEN: NOT AT ALL
1. FEELING NERVOUS, ANXIOUS, OR ON EDGE: NOT AT ALL
2. NOT BEING ABLE TO STOP OR CONTROL WORRYING: NOT AT ALL
6. BECOMING EASILY ANNOYED OR IRRITABLE: NOT AT ALL
5. BEING SO RESTLESS THAT IT IS HARD TO SIT STILL: NOT AT ALL
3. WORRYING TOO MUCH ABOUT DIFFERENT THINGS: NOT AT ALL
IF YOU CHECKED OFF ANY PROBLEMS ON THIS QUESTIONNAIRE, HOW DIFFICULT HAVE THESE PROBLEMS MADE IT FOR YOU TO DO YOUR WORK, TAKE CARE OF THINGS AT HOME, OR GET ALONG WITH OTHER PEOPLE: NOT DIFFICULT AT ALL
4. TROUBLE RELAXING: NOT AT ALL

## 2025-01-16 NOTE — PROGRESS NOTES
Chief Complaint   Patient presents with    New Patient     Vitals:    01/16/25 0829   BP: (!) 142/85   Pulse: 83   Temp: 97.9 °F (36.6 °C)   SpO2: 99%     \"Have you been to the ER, urgent care clinic since your last visit?  Hospitalized since your last visit?\"    NO    “Have you seen or consulted any other health care providers outside our system since your last visit?”    NO     “Have you had a pap smear?”    NO    No cervical cancer screening on file

## 2025-01-16 NOTE — PROGRESS NOTES
[facility] LIVER [facility]     Mina Fontanez MD, FACP, [facility], FAASLD   MD Tanya Sarmiento PA-C April S Ashworth, [facility]-BC   Loren Peters, [facility]   Carmen Pereyra, FNP-C  Cisco Le, [facility]-C   Lori Flores, [facility]-BC         Liver [facility]   at [facility]   [address], [address]   [place]  [address] [phone]   FAX: [phone]  Liver [facility]   [address], [address]   [place]  [address] [phone]   FAX: [phone]       Patient Care Team:  Sera Williamson DO as PCP - General (Family Medicine)  Sera Williamson DO as PCP - Empaneled Provider  ILA Lee MD as Physician  Shaniqua Paulson MD as Physician      Patient Active Problem List   Diagnosis    Lymphadenopathy of head and neck    Malignant neoplasm of lower-inner quadrant of right female breast (HCC)    Elevated liver enzymes       The clinicians listed above have asked me to see Loren L Alysapatricia in consultation regarding elevated liver enzymes and its management.      All medical records sent by the referring physicians were reviewed including imaging studies     The patient is a 49 y.o. female who was found to have elevated liver transaminases in 11/2023.    She had breast cancer in 2022.  This was treated with Lumpectomy. Chemotherapy and XRT.  She has not had any chemotherapy since since 2024..  .      Serologic evaluation for markers of chronic liver disease was negative.    The most recent imaging of the liver was Ultrasound performed in 9/2024.  Results suggest steatotic liver disease (SLD).      Assessment of liver fibrosis with Fibroscan was performed in the office today.  The result was 9.0 kPa which correlates with stage 2 fibrosis.  The CAP score of 351 suggests there Is hepatic steatosis.    In the office today the

## 2025-01-17 LAB
ALBUMIN SERPL-MCNC: 4.2 G/DL (ref 3.8–4.9)
ALP SERPL-CCNC: 53 IU/L (ref 44–121)
ALT SERPL-CCNC: 41 IU/L (ref 0–32)
AST SERPL-CCNC: 42 IU/L (ref 0–40)
BASOPHILS # BLD AUTO: 0.1 X10E3/UL (ref 0–0.2)
BASOPHILS NFR BLD AUTO: 1 %
BILIRUB DIRECT SERPL-MCNC: 0.12 MG/DL (ref 0–0.4)
BILIRUB SERPL-MCNC: 0.3 MG/DL (ref 0–1.2)
CERULOPLASMIN SERPL-MCNC: 40.5 MG/DL (ref 19–39)
EOSINOPHIL # BLD AUTO: 0.2 X10E3/UL (ref 0–0.4)
EOSINOPHIL NFR BLD AUTO: 3 %
ERYTHROCYTE [DISTWIDTH] IN BLOOD BY AUTOMATED COUNT: 13.9 % (ref 11.7–15.4)
HCT VFR BLD AUTO: 41.4 % (ref 34–46.6)
HGB BLD-MCNC: 13.4 G/DL (ref 11.1–15.9)
IMM GRANULOCYTES # BLD AUTO: 0 X10E3/UL (ref 0–0.1)
IMM GRANULOCYTES NFR BLD AUTO: 0 %
INR PPP: 1 (ref 0.9–1.2)
LYMPHOCYTES # BLD AUTO: 2.7 X10E3/UL (ref 0.7–3.1)
LYMPHOCYTES NFR BLD AUTO: 41 %
MCH RBC QN AUTO: 28.5 PG (ref 26.6–33)
MCHC RBC AUTO-ENTMCNC: 32.4 G/DL (ref 31.5–35.7)
MCV RBC AUTO: 88 FL (ref 79–97)
MONOCYTES # BLD AUTO: 0.4 X10E3/UL (ref 0.1–0.9)
MONOCYTES NFR BLD AUTO: 6 %
NEUTROPHILS # BLD AUTO: 3.2 X10E3/UL (ref 1.4–7)
NEUTROPHILS NFR BLD AUTO: 49 %
PLATELET # BLD AUTO: 262 X10E3/UL (ref 150–450)
PROT SERPL-MCNC: 6.9 G/DL (ref 6–8.5)
PROTHROMBIN TIME: 10.9 SEC (ref 9.1–12)
RBC # BLD AUTO: 4.7 X10E6/UL (ref 3.77–5.28)
WBC # BLD AUTO: 6.6 X10E3/UL (ref 3.4–10.8)

## 2025-01-18 LAB — A1AT SERPL-MCNC: 184 MG/DL (ref 101–187)

## 2025-01-28 PROBLEM — K75.81 METABOLIC DYSFUNCTION-ASSOCIATED STEATOHEPATITIS (MASH): Status: RESOLVED | Noted: 2025-01-16 | Resolved: 2025-01-28

## 2025-01-28 PROBLEM — R74.8 ELEVATED LIVER ENZYMES: Status: ACTIVE | Noted: 2025-01-28

## 2025-02-07 DIAGNOSIS — F41.9 ANXIETY: ICD-10-CM

## 2025-02-07 DIAGNOSIS — F32.A DEPRESSION, UNSPECIFIED DEPRESSION TYPE: ICD-10-CM

## 2025-02-11 RX ORDER — VENLAFAXINE HYDROCHLORIDE 75 MG/1
75 CAPSULE, EXTENDED RELEASE ORAL DAILY
Qty: 90 CAPSULE | Refills: 0 | Status: SHIPPED | OUTPATIENT
Start: 2025-02-11

## 2025-02-27 ENCOUNTER — TELEPHONE (OUTPATIENT)
Dept: PRIMARY CARE CLINIC | Facility: CLINIC | Age: 50
End: 2025-02-27

## 2025-02-27 ENCOUNTER — OFFICE VISIT (OUTPATIENT)
Dept: PRIMARY CARE CLINIC | Facility: CLINIC | Age: 50
End: 2025-02-27

## 2025-02-27 VITALS
DIASTOLIC BLOOD PRESSURE: 77 MMHG | BODY MASS INDEX: 31.32 KG/M2 | SYSTOLIC BLOOD PRESSURE: 118 MMHG | TEMPERATURE: 97.8 F | WEIGHT: 170.2 LBS | RESPIRATION RATE: 18 BRPM | HEIGHT: 62 IN | HEART RATE: 69 BPM | OXYGEN SATURATION: 99 %

## 2025-02-27 DIAGNOSIS — Z23 ENCOUNTER FOR IMMUNIZATION: ICD-10-CM

## 2025-02-27 DIAGNOSIS — R31.9 HEMATURIA, UNSPECIFIED TYPE: ICD-10-CM

## 2025-02-27 DIAGNOSIS — R73.03 PREDIABETES: ICD-10-CM

## 2025-02-27 DIAGNOSIS — F32.A DEPRESSION, UNSPECIFIED DEPRESSION TYPE: ICD-10-CM

## 2025-02-27 DIAGNOSIS — F41.9 ANXIETY: ICD-10-CM

## 2025-02-27 DIAGNOSIS — F41.9 ANXIETY: Primary | ICD-10-CM

## 2025-02-27 DIAGNOSIS — R30.0 DYSURIA: ICD-10-CM

## 2025-02-27 DIAGNOSIS — G47.00 INSOMNIA, UNSPECIFIED TYPE: ICD-10-CM

## 2025-02-27 DIAGNOSIS — Z00.00 WELL WOMAN EXAM (NO GYNECOLOGICAL EXAM): ICD-10-CM

## 2025-02-27 LAB
BILIRUBIN, URINE, POC: NEGATIVE
BLOOD URINE, POC: ABNORMAL
GLUCOSE URINE, POC: NEGATIVE
KETONES, URINE, POC: NEGATIVE
LEUKOCYTE ESTERASE, URINE, POC: ABNORMAL
NITRITE, URINE, POC: NEGATIVE
PH, URINE, POC: 6 (ref 4.6–8)
PROTEIN,URINE, POC: NEGATIVE
SPECIFIC GRAVITY, URINE, POC: 1.03 (ref 1–1.03)
URINALYSIS CLARITY, POC: ABNORMAL
URINALYSIS COLOR, POC: YELLOW
UROBILINOGEN, POC: ABNORMAL

## 2025-02-27 RX ORDER — TRAZODONE HYDROCHLORIDE 50 MG/1
50 TABLET ORAL NIGHTLY
Qty: 90 TABLET | Refills: 3 | Status: SHIPPED | OUTPATIENT
Start: 2025-02-27

## 2025-02-27 RX ORDER — LORAZEPAM 1 MG/1
1 TABLET ORAL EVERY 6 HOURS PRN
COMMUNITY

## 2025-02-27 RX ORDER — VENLAFAXINE HYDROCHLORIDE 75 MG/1
75 CAPSULE, EXTENDED RELEASE ORAL DAILY
Qty: 90 CAPSULE | Refills: 3 | Status: SHIPPED | OUTPATIENT
Start: 2025-02-27

## 2025-02-27 RX ORDER — OXYBUTYNIN CHLORIDE 5 MG/1
5 TABLET ORAL DAILY
COMMUNITY
Start: 2024-11-20

## 2025-02-27 RX ORDER — PHENTERMINE HYDROCHLORIDE 30 MG/1
30 CAPSULE ORAL EVERY MORNING
COMMUNITY

## 2025-02-27 SDOH — ECONOMIC STABILITY: FOOD INSECURITY: WITHIN THE PAST 12 MONTHS, THE FOOD YOU BOUGHT JUST DIDN'T LAST AND YOU DIDN'T HAVE MONEY TO GET MORE.: NEVER TRUE

## 2025-02-27 SDOH — ECONOMIC STABILITY: FOOD INSECURITY: WITHIN THE PAST 12 MONTHS, YOU WORRIED THAT YOUR FOOD WOULD RUN OUT BEFORE YOU GOT MONEY TO BUY MORE.: NEVER TRUE

## 2025-02-27 NOTE — PROGRESS NOTES
Health Decision Maker has been checked with the patient      AI form was signed    Chief Complaint   Patient presents with    Annual Exam       \"Have you been to the ER, urgent care clinic since your last visit?  Hospitalized since your last visit?\"    NO    “Have you seen or consulted any other health care providers outside of [facility] since your last visit?”    NO      Vitals:    02/27/25 0817   Weight: 77.2 kg (170 lb 3.2 oz)   Height: 1.575 m (5' 2\")      Depression: Not at risk (1/16/2025)    PHQ-2     PHQ-2 Score: 0       “Have you had a pap smear?”    YES - Where: [facility] Nurse/CMA to request most recent records if not in the chart    No cervical cancer screening on file             Click Here for Release of Records Request    Chart reviewed: immunizations are documented.     There is no immunization history on file for this patient.   
patient (or guardian, if applicable) and other individuals in attendance at the appointment consented to the use of AI, including the recording.       Earline Williamson DO

## 2025-02-27 NOTE — TELEPHONE ENCOUNTER
Patient call returning Dr Williamson's phone call.  Patient said she will be available for the next 30 minutes.  Please return her call

## 2025-02-28 LAB
BUN SERPL-MCNC: 15 MG/DL (ref 6–24)
BUN/CREAT SERPL: 22 (ref 9–23)
CALCIUM SERPL-MCNC: 9.6 MG/DL (ref 8.7–10.2)
CHLORIDE SERPL-SCNC: 103 MMOL/L (ref 96–106)
CHOLEST SERPL-MCNC: 104 MG/DL (ref 100–199)
CO2 SERPL-SCNC: 24 MMOL/L (ref 20–29)
CREAT SERPL-MCNC: 0.67 MG/DL (ref 0.57–1)
EGFRCR SERPLBLD CKD-EPI 2021: 106 ML/MIN/1.73
GLUCOSE SERPL-MCNC: 95 MG/DL (ref 70–99)
HBA1C MFR BLD: 6.1 % (ref 4.8–5.6)
HDLC SERPL-MCNC: 57 MG/DL
LDLC SERPL CALC-MCNC: 26 MG/DL (ref 0–99)
POTASSIUM SERPL-SCNC: 4.9 MMOL/L (ref 3.5–5.2)
SODIUM SERPL-SCNC: 142 MMOL/L (ref 134–144)
TRIGL SERPL-MCNC: 120 MG/DL (ref 0–149)
VLDLC SERPL CALC-MCNC: 21 MG/DL (ref 5–40)

## 2025-03-01 ENCOUNTER — TELEPHONE (OUTPATIENT)
Dept: PRIMARY CARE CLINIC | Facility: CLINIC | Age: 50
End: 2025-03-01

## 2025-03-01 LAB — BACTERIA UR CULT: ABNORMAL

## 2025-03-01 RX ORDER — DOXYCYCLINE HYCLATE 100 MG
100 TABLET ORAL 2 TIMES DAILY
Qty: 20 TABLET | Refills: 0 | Status: SHIPPED | OUTPATIENT
Start: 2025-03-01 | End: 2025-03-11

## 2025-03-04 LAB — DRUGS UR: NORMAL

## 2025-03-27 ENCOUNTER — OFFICE VISIT (OUTPATIENT)
Age: 50
End: 2025-03-27
Payer: COMMERCIAL

## 2025-03-27 ENCOUNTER — TELEPHONE (OUTPATIENT)
Dept: PRIMARY CARE CLINIC | Facility: CLINIC | Age: 50
End: 2025-03-27

## 2025-03-27 VITALS — HEIGHT: 62 IN | WEIGHT: 166 LBS | BODY MASS INDEX: 30.55 KG/M2

## 2025-03-27 DIAGNOSIS — Z85.3 HISTORY OF BREAST CANCER IN FEMALE: ICD-10-CM

## 2025-03-27 DIAGNOSIS — Z92.3 S/P RADIATION THERAPY: ICD-10-CM

## 2025-03-27 DIAGNOSIS — Z17.0 MALIGNANT NEOPLASM OF LOWER-INNER QUADRANT OF RIGHT BREAST OF FEMALE, ESTROGEN RECEPTOR POSITIVE: Primary | ICD-10-CM

## 2025-03-27 DIAGNOSIS — C50.311 MALIGNANT NEOPLASM OF LOWER-INNER QUADRANT OF RIGHT BREAST OF FEMALE, ESTROGEN RECEPTOR POSITIVE: Primary | ICD-10-CM

## 2025-03-27 DIAGNOSIS — Z98.890 S/P LUMPECTOMY OF BREAST: ICD-10-CM

## 2025-03-27 PROCEDURE — 99213 OFFICE O/P EST LOW 20 MIN: CPT | Performed by: NURSE PRACTITIONER

## 2025-03-27 NOTE — PROGRESS NOTES
HISTORY OF PRESENT ILLNESS  Loren Cox is a 49 y.o. female     HPI Established patient presents for follow-up for RIGHT breast cancer.  Denies breast mass, skin changes, nipple discharge and pain.          Breast history -   Referring - Dr. Galvez - [facility]  10/14/22- US guided RIGHT breast biopsy ([facility]) - RIGHT breast IDC 3:00 grd 2, 6mm, ER 80%, DE 50%, KI-67 <5%, HER2 FISH pos  Mammaprint - high risk  22 RIGHT breast lumpectomy  and SLNB - Dr. Paulson  1.4cm IDC; ER pos, DE pos, HER2 pos; node negative 0/2 - T1cN0  1/3/23 - port placed - Dr. Paulson  2023 completed adjuvant chemotherapy - Dr. Lee  23 - completed XRT - Dr. Peterson  2023 - started tamoxifen  2024 - completed Herceptin - Dr. Lee  24 - port removal - Dr. Paulson         Family history -    Mother - breast cancer   Maternal grandmother - ocular cancer   Great uncle - lung cancer   Genetic testing at [facility] - negative per patient       OB History          1    Para        Term   1            AB        Living   1         SAB        IAB        Ectopic        Molar        Multiple        Live Births              Obstetric Comments   Menarche 12, LMP , # of children 1, age of 1st delivery 35, Hysterectomy/oophorectomy no/no, Breast bx yes, history of breast feeding yes, BCP yes, Hormone therapy no                    Past Surgical History:   Procedure Laterality Date    BREAST LUMPECTOMY Right 2022    RIGHT BREAST ULTRASOUND GUIDED LUMPECTOMY/RIGHT BREAST SENTINEL NODE BIOPSY performed by Shaniqua Paulson MD at [facility] AMBULATORY OR    BREAST SURGERY      GYN          OTHER SURGICAL HISTORY      cat scratch fever, LYMPH NODE I&D    PORT SURGERY Left 2024    PORT REMOVAL performed by Shaniqua Paulson MD at [facility] AMBULATORY OR       Review of Systems      Physical Exam  Constitutional:       Appearance: Normal appearance.   Chest:   Breasts:     Right: No mass, nipple discharge, skin change or

## 2025-03-27 NOTE — TELEPHONE ENCOUNTER
Called patient left vm to call office back.     Due to provider emergency. We will need to cancel and reschedule appointment for tomorrow 3/28.

## 2025-03-31 ENCOUNTER — OFFICE VISIT (OUTPATIENT)
Dept: PRIMARY CARE CLINIC | Facility: CLINIC | Age: 50
End: 2025-03-31
Payer: COMMERCIAL

## 2025-03-31 ENCOUNTER — HOSPITAL ENCOUNTER (OUTPATIENT)
Facility: HOSPITAL | Age: 50
Discharge: HOME | End: 2025-04-03
Payer: COMMERCIAL

## 2025-03-31 VITALS
DIASTOLIC BLOOD PRESSURE: 73 MMHG | RESPIRATION RATE: 20 BRPM | HEART RATE: 100 BPM | HEIGHT: 62 IN | WEIGHT: 168 LBS | SYSTOLIC BLOOD PRESSURE: 116 MMHG | TEMPERATURE: 97.8 F | OXYGEN SATURATION: 99 % | BODY MASS INDEX: 30.91 KG/M2

## 2025-03-31 DIAGNOSIS — R31.1 BENIGN ESSENTIAL MICROSCOPIC HEMATURIA: ICD-10-CM

## 2025-03-31 DIAGNOSIS — G89.29 CHRONIC PAIN OF RIGHT KNEE: ICD-10-CM

## 2025-03-31 DIAGNOSIS — F41.9 ANXIETY: ICD-10-CM

## 2025-03-31 DIAGNOSIS — M25.561 CHRONIC PAIN OF RIGHT KNEE: ICD-10-CM

## 2025-03-31 DIAGNOSIS — G89.29 CHRONIC PAIN OF RIGHT KNEE: Primary | ICD-10-CM

## 2025-03-31 DIAGNOSIS — M25.561 CHRONIC PAIN OF RIGHT KNEE: Primary | ICD-10-CM

## 2025-03-31 PROCEDURE — 73562 X-RAY EXAM OF KNEE 3: CPT

## 2025-03-31 PROCEDURE — 99214 OFFICE O/P EST MOD 30 MIN: CPT | Performed by: FAMILY MEDICINE

## 2025-03-31 RX ORDER — LORAZEPAM 1 MG/1
0.5 TABLET ORAL DAILY PRN
Qty: 15 TABLET | Refills: 0 | Status: SHIPPED | OUTPATIENT
Start: 2025-03-31 | End: 2026-03-31

## 2025-03-31 NOTE — PROGRESS NOTES
HPI     Chief Complaint   Patient presents with    Follow-up     UTI follow up    Knee Pain     Right knee pain  Worsened pain  Knee pops when moving      History of Present Illness  The patient is a 49-year-old female presenting for UTI follow-up, right knee pain, and medication management.    Symptoms improved with doxycycline; currently asymptomatic.    Intermittent right knee pain for 6 months, attributed to arthritis. Pain onset after jumping off a horse and landing on feet, no immediate pain or popping. Pain worsens with use, localized to the inner knee, with clicking behind the kneecap, occasional swelling and warmth, no redness. History of ACL tear 15 years ago on L knee, not surgically repaired. Bone scan showed no cancer, possible arthritis. No x-ray performed. Relief with Voltaren gel and naproxen sodium, taken last night.    Currently on reduced phentermine dose, requests lorazepam refill, taking half of 1 mg tablet as needed.     Reviewed PmHx, RxHx, FmHx, SocHx, AllgHx and updated and dated in the chart.    Physical Exam:  /73   Pulse 100   Temp 97.8 °F (36.6 °C) (Oral)   Resp 20   Ht 1.575 m (5' 2\")   Wt 76.2 kg (168 lb)   SpO2 99%   BMI 30.73 kg/m²     Physical Exam  WNWD NAD  RRR no murmur  CTA no wheezes ronchi rales  Strength/ sensation intact in lower ext, full ROM of knee with patellofemoral crepitus noted, tenderness to palpation of medial joint line, no tenderness to palpation of patellar tendon, neg mcmurrys test, neg ant and post drawer testing, patellar reflexes intact       Results          No results found for this or any previous visit (from the past 12 hours).}        Assessment / Plan       ICD-10-CM    1. Chronic pain of right knee  M25.561 XR KNEE RIGHT (3 VIEWS)    G89.29       2. Benign essential microscopic hematuria  R31.1 Urinalysis with Microscopic      3. Anxiety  F41.9 LORazepam (ATIVAN) 1 MG tablet           Assessment & Plan  1. Chronic right knee pain -

## 2025-03-31 NOTE — PROGRESS NOTES
Health Decision Maker has been checked with the patient      AI form was signed    Chief Complaint   Patient presents with    Follow-up     UTI follow up    Knee Pain     Right knee pain  Worsened pain  Knee pops when moving        \"Have you been to the ER, urgent care clinic since your last visit?  Hospitalized since your last visit?\"    NO    “Have you seen or consulted any other health care providers outside of [facility] since your last visit?”    NO      Vitals:    03/31/25 1315   Weight: 76.2 kg (168 lb)      Depression: Not at risk (1/16/2025)    PHQ-2     PHQ-2 Score: 0       “Have you had a pap smear?”    YES - [facility]  Nurse/CMA to request most recent records if not in the chart    No cervical cancer screening on file             Click Here for Release of Records Request    Chart reviewed: immunizations are documented.   Immunization History   Administered Date(s) Administered    TDaP, ADACEL (age 10y-64y), BOOSTRIX (age 10y+), IM, 0.5mL 02/27/2025

## 2025-04-01 ENCOUNTER — RESULTS FOLLOW-UP (OUTPATIENT)
Dept: PRIMARY CARE CLINIC | Facility: CLINIC | Age: 50
End: 2025-04-01

## 2025-04-01 LAB
APPEARANCE UR: CLEAR
BACTERIA #/AREA URNS HPF: NORMAL /[HPF]
BILIRUB UR QL STRIP: NEGATIVE
CASTS URNS QL MICRO: NORMAL /LPF
COLOR UR: YELLOW
EPI CELLS #/AREA URNS HPF: NORMAL /HPF (ref 0–10)
GLUCOSE UR QL STRIP: NEGATIVE
HGB UR QL STRIP: NEGATIVE
KETONES UR QL STRIP: ABNORMAL
LEUKOCYTE ESTERASE UR QL STRIP: NEGATIVE
MICRO URNS: ABNORMAL
MICRO URNS: ABNORMAL
NITRITE UR QL STRIP: NEGATIVE
PH UR STRIP: 5 [PH] (ref 5–7.5)
PROT UR QL STRIP: NEGATIVE
RBC #/AREA URNS HPF: NORMAL /HPF (ref 0–2)
SP GR UR STRIP: 1.03 (ref 1–1.03)
UROBILINOGEN UR STRIP-MCNC: 0.2 MG/DL (ref 0.2–1)
WBC #/AREA URNS HPF: NORMAL /HPF (ref 0–5)

## 2025-04-03 ENCOUNTER — RESULTS FOLLOW-UP (OUTPATIENT)
Dept: PRIMARY CARE CLINIC | Facility: CLINIC | Age: 50
End: 2025-04-03

## 2025-05-15 NOTE — PROGRESS NOTES
[facility]     Mina Fontanez MD, FACP, MACG, FAASLD   MD Tanya Sarmiento PA-C April S Ashworth, [facility]-BC   Loren Peters, [facility]-   Carmen Pereyra, FNP-C  Cisco Le, [facility]-C   Lori Flores, [facility]   at [facility]   [address], [address]   [place]  [address] [phone]   FAX: [phone]  [facility]   [address], [address]   [place]  [address] [phone]   FAX: [phone]       Patient Care Team:  Sera Williamson DO as PCP - General (Family Medicine)  Sera Williamson DO as PCP - Empaneled Provider  ILA Lee MD as Physician  Shaniqua Paulson MD as Physician      Patient Active Problem List   Diagnosis    Lymphadenopathy of head and neck    Malignant neoplasm of lower-inner quadrant of right female breast (HCC)    Elevated liver enzymes     Loren Cox is being seen at [facility] for management of   elevated liver enzymes.  The active problem list, all pertinent past medical history, medications, liver histology, radiologic findings and laboratory findings related to the liver disorder were reviewed and discussed with the patient.      The patient is a 49 y.o. female who was found to have elevated liver transaminases in 11/2023.    She had breast cancer in 2022.  This was treated with Lumpectomy.,chemotherapy and XRT.  She has not had any chemotherapy since 2024.    Serologic evaluation for markers of chronic liver disease was negative.    The most recent imaging of the liver was Ultrasound performed in 9/2024.  Results suggest steatotic liver disease (SLD).      Assessment of liver fibrosis with Fibroscan was performed in 1/2025.  The result was 9.0 kPa which correlates with stage 2 fibrosis.  The CAP score of 351

## 2025-05-16 ENCOUNTER — TELEMEDICINE (OUTPATIENT)
Age: 50
End: 2025-05-16
Payer: COMMERCIAL

## 2025-05-16 DIAGNOSIS — R74.8 ELEVATED LIVER ENZYMES: ICD-10-CM

## 2025-05-16 DIAGNOSIS — K75.81 METABOLIC DYSFUNCTION-ASSOCIATED STEATOHEPATITIS (MASH): Primary | ICD-10-CM

## 2025-05-16 PROCEDURE — 99213 OFFICE O/P EST LOW 20 MIN: CPT | Performed by: NURSE PRACTITIONER

## 2025-05-16 RX ORDER — PHENTERMINE HYDROCHLORIDE 8 MG/1
1 TABLET ORAL 3 TIMES DAILY
COMMUNITY
Start: 2025-04-15

## 2025-05-16 RX ORDER — FUROSEMIDE 20 MG/1
20 TABLET ORAL DAILY
Qty: 60 TABLET | Refills: 3 | Status: SHIPPED | OUTPATIENT
Start: 2025-05-16

## 2025-05-16 ASSESSMENT — PATIENT HEALTH QUESTIONNAIRE - PHQ9
8. MOVING OR SPEAKING SO SLOWLY THAT OTHER PEOPLE COULD HAVE NOTICED. OR THE OPPOSITE, BEING SO FIGETY OR RESTLESS THAT YOU HAVE BEEN MOVING AROUND A LOT MORE THAN USUAL: NOT AT ALL
2. FEELING DOWN, DEPRESSED OR HOPELESS: NOT AT ALL
1. LITTLE INTEREST OR PLEASURE IN DOING THINGS: NOT AT ALL
3. TROUBLE FALLING OR STAYING ASLEEP: NOT AT ALL
5. POOR APPETITE OR OVEREATING: NOT AT ALL
DEPRESSION UNABLE TO ASSESS: FUNCTIONAL CAPACITY MOTIVATION LIMITS ACCURACY
SUM OF ALL RESPONSES TO PHQ QUESTIONS 1-9: 0
6. FEELING BAD ABOUT YOURSELF - OR THAT YOU ARE A FAILURE OR HAVE LET YOURSELF OR YOUR FAMILY DOWN: NOT AT ALL
SUM OF ALL RESPONSES TO PHQ QUESTIONS 1-9: 0
7. TROUBLE CONCENTRATING ON THINGS, SUCH AS READING THE NEWSPAPER OR WATCHING TELEVISION: NOT AT ALL
9. THOUGHTS THAT YOU WOULD BE BETTER OFF DEAD, OR OF HURTING YOURSELF: NOT AT ALL
SUM OF ALL RESPONSES TO PHQ QUESTIONS 1-9: 0
4. FEELING TIRED OR HAVING LITTLE ENERGY: NOT AT ALL
10. IF YOU CHECKED OFF ANY PROBLEMS, HOW DIFFICULT HAVE THESE PROBLEMS MADE IT FOR YOU TO DO YOUR WORK, TAKE CARE OF THINGS AT HOME, OR GET ALONG WITH OTHER PEOPLE: NOT DIFFICULT AT ALL
SUM OF ALL RESPONSES TO PHQ QUESTIONS 1-9: 0

## 2025-05-16 NOTE — PROGRESS NOTES
Chief Complaint   Patient presents with    Follow-up     Have you been to the ER, urgent care clinic since your last visit?  Hospitalized since your last visit?   NO    Have you seen or consulted any other health care providers outside our system since your last visit?   NO

## (undated) DEVICE — TUBING, SUCTION, 1/4" X 12', STRAIGHT: Brand: MEDLINE

## (undated) DEVICE — SPONGE GZ W4XL4IN COT 12 PLY TYP VII WVN C FLD DSGN

## (undated) DEVICE — SUTURE VCRL SZ 3-0 L27IN ABSRB UD L26MM SH 1/2 CIR J416H

## (undated) DEVICE — REM POLYHESIVE ADULT PATIENT RETURN ELECTRODE: Brand: VALLEYLAB

## (undated) DEVICE — PREP SKN CHLRAPRP APL 26ML STR --

## (undated) DEVICE — PROVE COVER: Brand: UNBRANDED

## (undated) DEVICE — PENCIL SMK EVAC L10FT DIA95MM TBNG NONSTICK W ADPT TO 22MM

## (undated) DEVICE — DERMABOND SKIN ADH 0.7ML -- DERMABOND ADVANCED 12/BX

## (undated) DEVICE — SYR 10ML LUER LOK 1/5ML GRAD --

## (undated) DEVICE — DECANTER BAG 9": Brand: MEDLINE INDUSTRIES, INC.

## (undated) DEVICE — GARMENT,MEDLINE,DVT,INT,CALF,MED, GEN2: Brand: MEDLINE

## (undated) DEVICE — CHEST PACK: Brand: MEDLINE INDUSTRIES, INC.

## (undated) DEVICE — HYPODERMIC SAFETY NEEDLE: Brand: MONOJECT

## (undated) DEVICE — ROCKER SWITCH PENCIL BLADE ELECTRODE, HOLSTER: Brand: EDGE

## (undated) DEVICE — MINOR BASIN -SMH: Brand: MEDLINE INDUSTRIES, INC.

## (undated) DEVICE — GLOVE SURG SZ 6 L12IN FNGR THK79MIL GRN LTX FREE

## (undated) DEVICE — SOLUTION IRRIG 1000ML 0.9% SOD CHL USP POUR PLAS BTL

## (undated) DEVICE — COVER US PRB W12XL244CM FLD IORT STR TIP

## (undated) DEVICE — HANDLE LT SNAP ON ULT DURABLE LENS FOR TRUMPF ALC DISPOSABLE

## (undated) DEVICE — SYRINGE MED 10ML TRNSLUC BRL PLUNG BLK MRK POLYPR CTRL

## (undated) DEVICE — PACK,BASIC,SIRUS,V: Brand: MEDLINE

## (undated) DEVICE — LIQUIBAND RAPID ADHESIVE 36/CS 0.8ML: Brand: MEDLINE

## (undated) DEVICE — CURVED, SMALL JAW, OPEN SEALER/DIVIDER: Brand: LIGASURE

## (undated) DEVICE — C-ARM: Brand: UNBRANDED

## (undated) DEVICE — 3M™ TEGADERM™ TRANSPARENT FILM DRESSING FRAME STYLE, 1626W, 4 IN X 4-3/4 IN (10 CM X 12 CM), 50/CT 4CT/CASE: Brand: 3M™ TEGADERM™

## (undated) DEVICE — YANKAUER,TAPERED BULBOUS TIP,W/O VENT: Brand: MEDLINE

## (undated) DEVICE — SUTURE PROL SZ 3-0 L36IN NONABSORBABLE BLU L26MM SH 1/2 CIR 8522H

## (undated) DEVICE — STRIP,CLOSURE,WOUND,MEDI-STRIP,1/2X4: Brand: MEDLINE

## (undated) DEVICE — DRAPE,CHEST,FENES,15X10,STERIL: Brand: MEDLINE

## (undated) DEVICE — SUT SLK 2-0SH 30IN BLK --

## (undated) DEVICE — SYRINGE IRRIG 60ML SFT PLIABLE BLB EZ TO GRP 1 HND USE W/

## (undated) DEVICE — SUT MCRYL 4-0 27IN PS2 UD --

## (undated) DEVICE — SYRINGE MED 10ML LUERLOCK TIP W/O SFTY DISP

## (undated) DEVICE — INTRO 6F 23CM INPT INTR KIT --

## (undated) DEVICE — SOLUTION IV 500ML 0.9% SOD CHL PH 5 INJ USP VIAFLX PLAS

## (undated) DEVICE — SUTURE VCRL SZ 3-0 L27IN ABSRB VLT L26MM SH 1/2 CIR J316H

## (undated) DEVICE — 1010 S-DRAPE TOWEL DRAPE 10/BX: Brand: STERI-DRAPE™

## (undated) DEVICE — X-RAY DETECTABLE SPONGES,16 PLY: Brand: VISTEC

## (undated) DEVICE — TOWEL SURG W17XL27IN STD BLU COT NONFENESTRATED PREWASHED

## (undated) DEVICE — SUTURE PERMAHAND SZ 2-0 L30IN NONABSORBABLE BLK SILK W/O A305H

## (undated) DEVICE — SUTURE VICRYL + SZ 3-0 L27IN ABSRB UD L26MM SH 1/2 CIR VCP416H

## (undated) DEVICE — PAD,NON-ADHERENT,W/AD,3X4,ST,LF,1/PK: Brand: MEDLINE

## (undated) DEVICE — GLOVE SURG 6 11.1IN BEAD CUF LIGHT BRN SENSICARE LTX FREE

## (undated) DEVICE — SUTURE MONOCRYL SZ 4-0 L27IN ABSRB UD L19MM PS-2 1/2 CIR PRIM Y426H

## (undated) DEVICE — BASIN ST MAJOR-NO CAUTERY: Brand: MEDLINE INDUSTRIES, INC.

## (undated) DEVICE — PENCIL SMK EVAC ALL IN 1 DSGN ENH VISIBILITY IMPROVED AIR

## (undated) DEVICE — DRAPE,LAPAROTOMY,T,PEDI,STERILE: Brand: MEDLINE

## (undated) DEVICE — INSULATED BLADE ELECTRODE: Brand: EDGE

## (undated) DEVICE — PROBE DETECTION F/LUMPECTOMY -- ORDER IN MULTIPLES OF 5 EA ..MARGINPROBE

## (undated) DEVICE — SUTURE MCRYL SZ 4-0 L27IN ABSRB UD L19MM PS-2 1/2 CIR PRIM Y426H

## (undated) DEVICE — INTENT OT USE PROVIDES A STERILE INTERFACE BETWEEN THE OPERATING ROOM SURGICAL LAMPS (NON-STERILE) AND THE SURGEON OR STAFF WORKING IN THE STERILE FIELD.: Brand: ASPEN® ALC PLUS LIGHT HANDLE COVER

## (undated) DEVICE — BLADE ES ELASTOMERIC COAT INSUL DURABLE BEND UPTO 90DEG

## (undated) DEVICE — SUTURE VCRL SZ 2-0 L27IN ABSRB UD L26MM SH 1/2 CIR J417H

## (undated) DEVICE — ELECTRODE PT RET AD L9FT HI MOIST COND ADH HYDRGEL CORDED